# Patient Record
Sex: FEMALE | Race: WHITE | NOT HISPANIC OR LATINO | Employment: OTHER | URBAN - METROPOLITAN AREA
[De-identification: names, ages, dates, MRNs, and addresses within clinical notes are randomized per-mention and may not be internally consistent; named-entity substitution may affect disease eponyms.]

---

## 2023-03-21 DIAGNOSIS — E03.9 HYPOTHYROIDISM, UNSPECIFIED TYPE: Primary | ICD-10-CM

## 2023-03-21 RX ORDER — LEVOTHYROXINE SODIUM 75 UG/1
75 TABLET ORAL DAILY
Qty: 90 TABLET | Refills: 1 | Status: SHIPPED | OUTPATIENT
Start: 2023-03-21 | End: 2023-09-18

## 2023-03-21 RX ORDER — LEVOTHYROXINE SODIUM 75 UG/1
75 TABLET ORAL DAILY
COMMUNITY
End: 2023-03-21 | Stop reason: SDUPTHER

## 2023-05-05 DIAGNOSIS — I10 BENIGN ESSENTIAL HTN: Primary | ICD-10-CM

## 2023-05-05 RX ORDER — ATENOLOL AND CHLORTHALIDONE TABLET 50; 25 MG/1; MG/1
1 TABLET ORAL DAILY
COMMUNITY
End: 2023-05-05 | Stop reason: SDUPTHER

## 2023-05-05 RX ORDER — ATENOLOL AND CHLORTHALIDONE TABLET 50; 25 MG/1; MG/1
1 TABLET ORAL DAILY
Qty: 30 TABLET | Refills: 1 | Status: SHIPPED | OUTPATIENT
Start: 2023-05-05 | End: 2023-08-11 | Stop reason: SDUPTHER

## 2023-06-28 DIAGNOSIS — F51.01 PRIMARY INSOMNIA: Primary | ICD-10-CM

## 2023-06-28 RX ORDER — TRAZODONE HYDROCHLORIDE 50 MG/1
50 TABLET ORAL NIGHTLY
COMMUNITY
Start: 2017-02-06 | End: 2023-06-28 | Stop reason: SDUPTHER

## 2023-06-28 RX ORDER — TRAZODONE HYDROCHLORIDE 50 MG/1
50 TABLET ORAL NIGHTLY
Qty: 30 TABLET | Refills: 1 | Status: SHIPPED | OUTPATIENT
Start: 2023-06-28 | End: 2023-09-11

## 2023-07-27 DIAGNOSIS — F32.A DEPRESSION, UNSPECIFIED DEPRESSION TYPE: Primary | ICD-10-CM

## 2023-07-27 RX ORDER — FLUOXETINE HYDROCHLORIDE 20 MG/1
20 CAPSULE ORAL DAILY
Qty: 30 CAPSULE | Refills: 0 | Status: SHIPPED | OUTPATIENT
Start: 2023-07-27 | End: 2023-09-01

## 2023-08-10 ENCOUNTER — TELEPHONE (OUTPATIENT)
Dept: PRIMARY CARE | Facility: CLINIC | Age: 78
End: 2023-08-10
Payer: MEDICARE

## 2023-08-10 DIAGNOSIS — I10 BENIGN ESSENTIAL HTN: ICD-10-CM

## 2023-08-10 NOTE — TELEPHONE ENCOUNTER
Pt called asking for a 6 month follow up mammogram; [facility]

## 2023-08-11 ENCOUNTER — TELEPHONE (OUTPATIENT)
Dept: PRIMARY CARE | Facility: CLINIC | Age: 78
End: 2023-08-11
Payer: MEDICARE

## 2023-08-11 DIAGNOSIS — Z12.31 ENCOUNTER FOR SCREENING MAMMOGRAM FOR BREAST CANCER: Primary | ICD-10-CM

## 2023-08-11 DIAGNOSIS — R92.8 OTHER ABNORMAL AND INCONCLUSIVE FINDINGS ON DIAGNOSTIC IMAGING OF BREAST: ICD-10-CM

## 2023-08-11 DIAGNOSIS — N63.10 MASS OF RIGHT BREAST, UNSPECIFIED QUADRANT: Primary | ICD-10-CM

## 2023-08-11 RX ORDER — ATENOLOL AND CHLORTHALIDONE TABLET 50; 25 MG/1; MG/1
1 TABLET ORAL DAILY
Qty: 30 TABLET | Refills: 1 | Status: SHIPPED | OUTPATIENT
Start: 2023-08-11 | End: 2023-09-13 | Stop reason: SDUPTHER

## 2023-08-11 NOTE — TELEPHONE ENCOUNTER
Radiology report says to repeat at the time of regularly scheduled mammogram. So, I will order as screening.

## 2023-08-11 NOTE — TELEPHONE ENCOUNTER
Pt is requesting order for ultrsound of right breast. She states that the order that was just put in today is incorrect. She states that she does not need a screening.

## 2023-08-11 NOTE — TELEPHONE ENCOUNTER
Pt states that she is needs Dr. Sue to order a mammogram follow up for her. She says that there was an abnormality found with her last one that was done in Feb 2023.     [phone]

## 2023-08-31 DIAGNOSIS — R92.8 ABNORMAL MAMMOGRAM: Primary | ICD-10-CM

## 2023-09-01 DIAGNOSIS — F32.A DEPRESSION, UNSPECIFIED DEPRESSION TYPE: ICD-10-CM

## 2023-09-01 RX ORDER — FLUOXETINE HYDROCHLORIDE 20 MG/1
20 CAPSULE ORAL DAILY
Qty: 30 CAPSULE | Refills: 0 | Status: SHIPPED | OUTPATIENT
Start: 2023-09-01 | End: 2023-09-05

## 2023-09-04 DIAGNOSIS — F32.A DEPRESSION, UNSPECIFIED DEPRESSION TYPE: ICD-10-CM

## 2023-09-05 RX ORDER — FLUOXETINE HYDROCHLORIDE 20 MG/1
20 CAPSULE ORAL DAILY
Qty: 30 CAPSULE | Refills: 0 | Status: SHIPPED | OUTPATIENT
Start: 2023-09-05 | End: 2023-10-06 | Stop reason: SDUPTHER

## 2023-09-09 DIAGNOSIS — F51.01 PRIMARY INSOMNIA: ICD-10-CM

## 2023-09-11 RX ORDER — TRAZODONE HYDROCHLORIDE 50 MG/1
50 TABLET ORAL NIGHTLY
Qty: 30 TABLET | Refills: 0 | Status: SHIPPED | OUTPATIENT
Start: 2023-09-11 | End: 2023-11-01 | Stop reason: SDUPTHER

## 2023-09-13 ENCOUNTER — OFFICE VISIT (OUTPATIENT)
Dept: PRIMARY CARE | Facility: CLINIC | Age: 78
End: 2023-09-13
Payer: MEDICARE

## 2023-09-13 ENCOUNTER — LAB (OUTPATIENT)
Dept: LAB | Facility: LAB | Age: 78
End: 2023-09-13
Payer: MEDICARE

## 2023-09-13 VITALS
HEIGHT: 72 IN | WEIGHT: 239 LBS | DIASTOLIC BLOOD PRESSURE: 88 MMHG | TEMPERATURE: 97.1 F | HEART RATE: 56 BPM | BODY MASS INDEX: 32.37 KG/M2 | RESPIRATION RATE: 18 BRPM | OXYGEN SATURATION: 95 % | SYSTOLIC BLOOD PRESSURE: 152 MMHG

## 2023-09-13 DIAGNOSIS — I10 BENIGN ESSENTIAL HTN: ICD-10-CM

## 2023-09-13 DIAGNOSIS — Z00.00 HEALTHCARE MAINTENANCE: ICD-10-CM

## 2023-09-13 DIAGNOSIS — C55 MALIGNANT NEOPLASM OF UTERUS, UNSPECIFIED SITE (MULTI): ICD-10-CM

## 2023-09-13 DIAGNOSIS — N18.31 STAGE 3A CHRONIC KIDNEY DISEASE (MULTI): ICD-10-CM

## 2023-09-13 DIAGNOSIS — Z00.00 ROUTINE GENERAL MEDICAL EXAMINATION AT HEALTH CARE FACILITY: Primary | ICD-10-CM

## 2023-09-13 DIAGNOSIS — E03.9 ACQUIRED HYPOTHYROIDISM: ICD-10-CM

## 2023-09-13 DIAGNOSIS — E87.6 HYPOKALEMIA: Primary | ICD-10-CM

## 2023-09-13 DIAGNOSIS — Z23 NEED FOR IMMUNIZATION AGAINST INFLUENZA: ICD-10-CM

## 2023-09-13 DIAGNOSIS — Z00.00 ROUTINE GENERAL MEDICAL EXAMINATION AT HEALTH CARE FACILITY: ICD-10-CM

## 2023-09-13 PROBLEM — R79.89 ABNORMAL LFTS: Status: ACTIVE | Noted: 2023-09-13

## 2023-09-13 PROBLEM — R92.8 ABNORMAL MAMMOGRAM: Status: ACTIVE | Noted: 2023-09-13

## 2023-09-13 PROBLEM — M17.12 PRIMARY OSTEOARTHRITIS OF LEFT KNEE: Status: ACTIVE | Noted: 2023-09-13

## 2023-09-13 LAB
ALANINE AMINOTRANSFERASE (SGPT) (U/L) IN SER/PLAS: 18 U/L (ref 7–45)
ALBUMIN (G/DL) IN SER/PLAS: 4 G/DL (ref 3.4–5)
ALKALINE PHOSPHATASE (U/L) IN SER/PLAS: 73 U/L (ref 33–136)
ANION GAP IN SER/PLAS: 10 MMOL/L (ref 10–20)
ASPARTATE AMINOTRANSFERASE (SGOT) (U/L) IN SER/PLAS: 22 U/L (ref 9–39)
BILIRUBIN TOTAL (MG/DL) IN SER/PLAS: 0.6 MG/DL (ref 0–1.2)
CALCIUM (MG/DL) IN SER/PLAS: 8.9 MG/DL (ref 8.6–10.3)
CARBON DIOXIDE, TOTAL (MMOL/L) IN SER/PLAS: 36 MMOL/L (ref 21–32)
CHLORIDE (MMOL/L) IN SER/PLAS: 100 MMOL/L (ref 98–107)
CHOLESTEROL (MG/DL) IN SER/PLAS: 149 MG/DL (ref 0–199)
CHOLESTEROL IN HDL (MG/DL) IN SER/PLAS: 32.9 MG/DL
CHOLESTEROL/HDL RATIO: 4.5
CREATININE (MG/DL) IN SER/PLAS: 1.2 MG/DL (ref 0.5–1.05)
ERYTHROCYTE DISTRIBUTION WIDTH (RATIO) BY AUTOMATED COUNT: 13.5 % (ref 11.5–14.5)
ERYTHROCYTE MEAN CORPUSCULAR HEMOGLOBIN CONCENTRATION (G/DL) BY AUTOMATED: 33.3 G/DL (ref 32–36)
ERYTHROCYTE MEAN CORPUSCULAR VOLUME (FL) BY AUTOMATED COUNT: 88 FL (ref 80–100)
ERYTHROCYTES (10*6/UL) IN BLOOD BY AUTOMATED COUNT: 4.76 X10E12/L (ref 4–5.2)
GFR FEMALE: 46 ML/MIN/1.73M2
GLUCOSE (MG/DL) IN SER/PLAS: 122 MG/DL (ref 74–99)
HEMATOCRIT (%) IN BLOOD BY AUTOMATED COUNT: 42 % (ref 36–46)
HEMOGLOBIN (G/DL) IN BLOOD: 14 G/DL (ref 12–16)
LDL: 92 MG/DL (ref 0–99)
LEUKOCYTES (10*3/UL) IN BLOOD BY AUTOMATED COUNT: 8 X10E9/L (ref 4.4–11.3)
PLATELETS (10*3/UL) IN BLOOD AUTOMATED COUNT: 270 X10E9/L (ref 150–450)
POTASSIUM (MMOL/L) IN SER/PLAS: 2.9 MMOL/L (ref 3.5–5.3)
PROTEIN TOTAL: 7 G/DL (ref 6.4–8.2)
SODIUM (MMOL/L) IN SER/PLAS: 143 MMOL/L (ref 136–145)
THYROTROPIN (MIU/L) IN SER/PLAS BY DETECTION LIMIT <= 0.05 MIU/L: 2.09 MIU/L (ref 0.44–3.98)
TRIGLYCERIDE (MG/DL) IN SER/PLAS: 119 MG/DL (ref 0–149)
UREA NITROGEN (MG/DL) IN SER/PLAS: 15 MG/DL (ref 6–23)
VLDL: 24 MG/DL (ref 0–40)

## 2023-09-13 PROCEDURE — 1170F FXNL STATUS ASSESSED: CPT | Performed by: FAMILY MEDICINE

## 2023-09-13 PROCEDURE — 80053 COMPREHEN METABOLIC PANEL: CPT

## 2023-09-13 PROCEDURE — 1159F MED LIST DOCD IN RCRD: CPT | Performed by: FAMILY MEDICINE

## 2023-09-13 PROCEDURE — 80061 LIPID PANEL: CPT

## 2023-09-13 PROCEDURE — 85027 COMPLETE CBC AUTOMATED: CPT

## 2023-09-13 PROCEDURE — 36415 COLL VENOUS BLD VENIPUNCTURE: CPT

## 2023-09-13 PROCEDURE — 3079F DIAST BP 80-89 MM HG: CPT | Performed by: FAMILY MEDICINE

## 2023-09-13 PROCEDURE — G0008 ADMIN INFLUENZA VIRUS VAC: HCPCS | Performed by: FAMILY MEDICINE

## 2023-09-13 PROCEDURE — G0439 PPPS, SUBSEQ VISIT: HCPCS | Performed by: FAMILY MEDICINE

## 2023-09-13 PROCEDURE — 84443 ASSAY THYROID STIM HORMONE: CPT

## 2023-09-13 PROCEDURE — 90662 IIV NO PRSV INCREASED AG IM: CPT | Performed by: FAMILY MEDICINE

## 2023-09-13 PROCEDURE — 3077F SYST BP >= 140 MM HG: CPT | Performed by: FAMILY MEDICINE

## 2023-09-13 RX ORDER — ATENOLOL AND CHLORTHALIDONE TABLET 50; 25 MG/1; MG/1
1 TABLET ORAL DAILY
Qty: 30 TABLET | Refills: 11 | Status: SHIPPED | OUTPATIENT
Start: 2023-09-13

## 2023-09-13 RX ORDER — POTASSIUM CHLORIDE 20 MEQ/1
20 TABLET, EXTENDED RELEASE ORAL DAILY
Qty: 30 TABLET | Refills: 11 | Status: SHIPPED | OUTPATIENT
Start: 2023-09-13 | End: 2024-09-12

## 2023-09-13 RX ORDER — ATENOLOL AND CHLORTHALIDONE TABLET 50; 25 MG/1; MG/1
1 TABLET ORAL DAILY
Qty: 30 TABLET | Refills: 1 | Status: SHIPPED | OUTPATIENT
Start: 2023-09-13 | End: 2023-09-13 | Stop reason: SDUPTHER

## 2023-09-13 RX ORDER — INFLUENZA A VIRUS A/VICTORIA/4897/2022 IVR-238 (H1N1) ANTIGEN (FORMALDEHYDE INACTIVATED), INFLUENZA A VIRUS A/DARWIN/9/2021 SAN-010 (H3N2) ANTIGEN (FORMALDEHYDE INACTIVATED), INFLUENZA B VIRUS B/PHUKET/3073/2013 ANTIGEN (FORMALDEHYDE INACTIVATED), AND INFLUENZA B VIRUS B/MICHIGAN/01/2021 ANTIGEN (FORMALDEHYDE INACTIVATED) 60; 60; 60; 60 UG/.7ML; UG/.7ML; UG/.7ML; UG/.7ML
0.7 INJECTION, SUSPENSION INTRAMUSCULAR ONCE
Qty: 0.7 ML | Refills: 0 | Status: SHIPPED | OUTPATIENT
Start: 2023-09-13 | End: 2023-09-13 | Stop reason: ENTERED-IN-ERROR

## 2023-09-13 ASSESSMENT — ACTIVITIES OF DAILY LIVING (ADL)
DRESSING: INDEPENDENT
GROCERY_SHOPPING: INDEPENDENT
TAKING_MEDICATION: INDEPENDENT
BATHING: INDEPENDENT
MANAGING_FINANCES: INDEPENDENT
DOING_HOUSEWORK: INDEPENDENT

## 2023-09-13 ASSESSMENT — ENCOUNTER SYMPTOMS
PSYCHIATRIC NEGATIVE: 1
GASTROINTESTINAL NEGATIVE: 1
OCCASIONAL FEELINGS OF UNSTEADINESS: 0
ENDOCRINE NEGATIVE: 1
RESPIRATORY NEGATIVE: 1
CONSTITUTIONAL NEGATIVE: 1
CARDIOVASCULAR NEGATIVE: 1
EYES NEGATIVE: 1
NEUROLOGICAL NEGATIVE: 1
DEPRESSION: 0
LOSS OF SENSATION IN FEET: 0

## 2023-09-13 ASSESSMENT — PATIENT HEALTH QUESTIONNAIRE - PHQ9
2. FEELING DOWN, DEPRESSED OR HOPELESS: NOT AT ALL
1. LITTLE INTEREST OR PLEASURE IN DOING THINGS: NOT AT ALL
SUM OF ALL RESPONSES TO PHQ9 QUESTIONS 1 AND 2: 0

## 2023-09-13 NOTE — PROGRESS NOTES
Subjective   Reason for Visit: Fadia Akbar is an 78 y.o. female here for a Medicare Wellness visit.        Reviewed all medications by prescribing practitioner or clinical pharmacist (such as prescriptions, OTCs, herbal therapies and supplements) and documented in the medical record.    Patient Care Team:  Temitope Sue DO as PCP - General  Ángel Hair MD as PCP - [employer] PCP     Review of Systems   Constitutional: Negative.    HENT: Negative.     Eyes: Negative.    Respiratory: Negative.     Cardiovascular: Negative.    Gastrointestinal: Negative.    Endocrine: Negative.    Genitourinary: Negative.    Skin: Negative.    Neurological: Negative.    Psychiatric/Behavioral: Negative.         Objective   Vitals:  /88   Pulse 56   Temp 36.2 °C (97.1 °F)   Resp 18   Ht 1.829 m (6')   Wt 108 kg (239 lb)   SpO2 95%   BMI 32.41 kg/m²       Physical Exam  Constitutional:       General: She is not in acute distress.     Appearance: Normal appearance.   HENT:      Head: Normocephalic and atraumatic.      Right Ear: Tympanic membrane normal.      Left Ear: Tympanic membrane normal.      Nose: Nose normal.      Mouth/Throat:      Mouth: Mucous membranes are dry.      Pharynx: Oropharynx is clear.   Eyes:      Conjunctiva/sclera: Conjunctivae normal.      Pupils: Pupils are equal, round, and reactive to light.   Neck:      Vascular: No carotid bruit.   Cardiovascular:      Rate and Rhythm: Normal rate and regular rhythm.      Heart sounds: Normal heart sounds.   Pulmonary:      Effort: Pulmonary effort is normal.      Breath sounds: Normal breath sounds.   Abdominal:      General: Bowel sounds are normal.      Palpations: Abdomen is soft.   Musculoskeletal:      Cervical back: Neck supple. No tenderness.   Skin:     General: Skin is warm and dry.   Neurological:      General: No focal deficit present.      Mental Status: She is alert.   Psychiatric:         Mood and Affect: Mood  normal.         Behavior: Behavior normal.         Assessment/Plan   Problem List Items Addressed This Visit       Benign essential HTN    Relevant Medications    atenoloL-chlorthalidone (Tenoretic) 50-25 mg tablet    Other Relevant Orders    CBC    Comprehensive Metabolic Panel    Lipid Panel     Other Visit Diagnoses       Routine general medical examination at health care facility    -  Primary    Relevant Medications    atenoloL-chlorthalidone (Tenoretic) 50-25 mg tablet    Other Relevant Orders    CBC    Comprehensive Metabolic Panel    Lipid Panel    Healthcare maintenance        Relevant Medications    atenoloL-chlorthalidone (Tenoretic) 50-25 mg tablet    Other Relevant Orders    CBC    Comprehensive Metabolic Panel    Lipid Panel

## 2023-09-14 ENCOUNTER — TELEPHONE (OUTPATIENT)
Dept: PRIMARY CARE | Facility: CLINIC | Age: 78
End: 2023-09-14
Payer: MEDICARE

## 2023-09-16 DIAGNOSIS — E03.9 HYPOTHYROIDISM, UNSPECIFIED TYPE: ICD-10-CM

## 2023-09-18 RX ORDER — LEVOTHYROXINE SODIUM 75 UG/1
75 TABLET ORAL DAILY
Qty: 90 TABLET | Refills: 0 | Status: SHIPPED | OUTPATIENT
Start: 2023-09-18 | End: 2024-01-03

## 2023-10-06 DIAGNOSIS — F32.A DEPRESSION, UNSPECIFIED DEPRESSION TYPE: ICD-10-CM

## 2023-10-06 RX ORDER — FLUOXETINE HYDROCHLORIDE 20 MG/1
20 CAPSULE ORAL DAILY
Qty: 30 CAPSULE | Refills: 5 | Status: SHIPPED | OUTPATIENT
Start: 2023-10-06 | End: 2023-12-05 | Stop reason: SDUPTHER

## 2023-10-17 ENCOUNTER — LAB (OUTPATIENT)
Dept: LAB | Facility: LAB | Age: 78
End: 2023-10-17
Payer: MEDICARE

## 2023-10-17 DIAGNOSIS — E87.6 HYPOKALEMIA: ICD-10-CM

## 2023-10-17 LAB
ANION GAP SERPL CALC-SCNC: 15 MMOL/L (ref 10–20)
BUN SERPL-MCNC: 17 MG/DL (ref 6–23)
CALCIUM SERPL-MCNC: 9.5 MG/DL (ref 8.6–10.3)
CHLORIDE SERPL-SCNC: 98 MMOL/L (ref 98–107)
CO2 SERPL-SCNC: 33 MMOL/L (ref 21–32)
CREAT SERPL-MCNC: 1.47 MG/DL (ref 0.5–1.05)
GFR SERPL CREATININE-BSD FRML MDRD: 36 ML/MIN/1.73M*2
GLUCOSE SERPL-MCNC: 185 MG/DL (ref 74–99)
POTASSIUM SERPL-SCNC: 3.5 MMOL/L (ref 3.5–5.3)
SODIUM SERPL-SCNC: 142 MMOL/L (ref 136–145)

## 2023-10-17 PROCEDURE — 80048 BASIC METABOLIC PNL TOTAL CA: CPT

## 2023-10-17 PROCEDURE — 36415 COLL VENOUS BLD VENIPUNCTURE: CPT

## 2023-10-24 ENCOUNTER — TELEPHONE (OUTPATIENT)
Dept: PRIMARY CARE | Facility: CLINIC | Age: 78
End: 2023-10-24
Payer: MEDICARE

## 2023-10-24 NOTE — TELEPHONE ENCOUNTER
Pt is returning a call from our office. There is no documentation as to who called. Pt would like a call back in regards to this. Pt says it could be about recent lab results but she is not sure.

## 2023-10-26 ENCOUNTER — OFFICE VISIT (OUTPATIENT)
Dept: PRIMARY CARE | Facility: CLINIC | Age: 78
End: 2023-10-26
Payer: MEDICARE

## 2023-10-26 VITALS
HEIGHT: 72 IN | SYSTOLIC BLOOD PRESSURE: 130 MMHG | HEART RATE: 82 BPM | BODY MASS INDEX: 32.23 KG/M2 | DIASTOLIC BLOOD PRESSURE: 82 MMHG | TEMPERATURE: 97.8 F | OXYGEN SATURATION: 98 % | RESPIRATION RATE: 16 BRPM | WEIGHT: 238 LBS

## 2023-10-26 DIAGNOSIS — E11.9 NEW ONSET TYPE 2 DIABETES MELLITUS (MULTI): Primary | ICD-10-CM

## 2023-10-26 LAB
POC FINGERSTICK BLOOD GLUCOSE: 190 MG/DL (ref 70–100)
POC HEMOGLOBIN A1C: 6.6 % (ref 4.2–6.5)

## 2023-10-26 PROCEDURE — 3079F DIAST BP 80-89 MM HG: CPT | Performed by: FAMILY MEDICINE

## 2023-10-26 PROCEDURE — 1159F MED LIST DOCD IN RCRD: CPT | Performed by: FAMILY MEDICINE

## 2023-10-26 PROCEDURE — 82962 GLUCOSE BLOOD TEST: CPT | Performed by: FAMILY MEDICINE

## 2023-10-26 PROCEDURE — 3075F SYST BP GE 130 - 139MM HG: CPT | Performed by: FAMILY MEDICINE

## 2023-10-26 PROCEDURE — 99213 OFFICE O/P EST LOW 20 MIN: CPT | Performed by: FAMILY MEDICINE

## 2023-10-26 PROCEDURE — 1036F TOBACCO NON-USER: CPT | Performed by: FAMILY MEDICINE

## 2023-10-26 PROCEDURE — 83036 HEMOGLOBIN GLYCOSYLATED A1C: CPT | Performed by: FAMILY MEDICINE

## 2023-10-26 NOTE — PROGRESS NOTES
Subjective   Patient ID: Fadia Akbar is a 78 y.o. female who presents for Diabetes (New onset ).    Root beer: 1 hour ago   6-8 pop cans a day  Lots of frozen food   Amenable to dietician, PT (knees hurt)     Takes a nap 2 hours in afternoon, goes to bad at 8pm, gets up 7:30am. Gets up 5x a night due to dog (14 years old) wanting to go out.     Diabetes  She presents for her initial diabetic visit. She has type 2 diabetes mellitus. Her disease course has been stable. There are no hypoglycemic associated symptoms. Associated symptoms include polyuria. There are no diabetic complications. When asked about current treatments, none were reported.        Review of Systems   Endocrine: Positive for polyuria.     General: +fatigue, denies unintentional weight change    Objective   /82 (BP Location: Left arm, Patient Position: Sitting)   Pulse 82   Temp 36.6 °C (97.8 °F)   Resp 16   Ht 1.829 m (6')   Wt 108 kg (238 lb)   SpO2 98%   BMI 32.28 kg/m²     Physical Exam  Constitutional:       Appearance: Normal appearance.   HENT:      Head: Normocephalic and atraumatic.      Nose: Nose normal.      Mouth/Throat:      Mouth: Mucous membranes are moist.   Cardiovascular:      Rate and Rhythm: Normal rate and regular rhythm.   Pulmonary:      Effort: Pulmonary effort is normal.      Breath sounds: Normal breath sounds.   Neurological:      Mental Status: She is alert.         Assessment/Plan   Problem List Items Addressed This Visit    None  Visit Diagnoses         Codes    New onset type 2 diabetes mellitus (CMS/[facility])    -  Primary E11.9    Relevant Orders    POCT glycosylated hemoglobin (Hb A1C) manually resulted (Completed)    POCT fingerstick glucose manually resulted (Completed)    Referral to Diabetic Education

## 2023-11-01 DIAGNOSIS — F51.01 PRIMARY INSOMNIA: ICD-10-CM

## 2023-11-01 RX ORDER — TRAZODONE HYDROCHLORIDE 50 MG/1
50 TABLET ORAL NIGHTLY
Qty: 30 TABLET | Refills: 0 | Status: SHIPPED | OUTPATIENT
Start: 2023-11-01 | End: 2023-11-02

## 2023-11-02 DIAGNOSIS — F51.01 PRIMARY INSOMNIA: ICD-10-CM

## 2023-11-02 RX ORDER — TRAZODONE HYDROCHLORIDE 50 MG/1
50 TABLET ORAL NIGHTLY
Qty: 30 TABLET | Refills: 0 | Status: SHIPPED | OUTPATIENT
Start: 2023-11-02 | End: 2024-01-03

## 2023-12-05 DIAGNOSIS — F32.A DEPRESSION, UNSPECIFIED DEPRESSION TYPE: ICD-10-CM

## 2023-12-05 RX ORDER — FLUOXETINE HYDROCHLORIDE 20 MG/1
20 CAPSULE ORAL DAILY
Qty: 30 CAPSULE | Refills: 5 | Status: SHIPPED | OUTPATIENT
Start: 2023-12-05 | End: 2024-01-03 | Stop reason: SDUPTHER

## 2023-12-15 DIAGNOSIS — I10 BENIGN ESSENTIAL HTN: ICD-10-CM

## 2023-12-15 DIAGNOSIS — Z00.00 HEALTHCARE MAINTENANCE: ICD-10-CM

## 2023-12-15 DIAGNOSIS — Z00.00 ROUTINE GENERAL MEDICAL EXAMINATION AT HEALTH CARE FACILITY: ICD-10-CM

## 2023-12-15 RX ORDER — ATENOLOL AND CHLORTHALIDONE TABLET 50; 25 MG/1; MG/1
1 TABLET ORAL DAILY
Qty: 30 TABLET | Refills: 11 | Status: CANCELLED | OUTPATIENT
Start: 2023-12-15

## 2024-01-03 DIAGNOSIS — F51.01 PRIMARY INSOMNIA: ICD-10-CM

## 2024-01-03 DIAGNOSIS — F32.A DEPRESSION, UNSPECIFIED DEPRESSION TYPE: ICD-10-CM

## 2024-01-03 DIAGNOSIS — E03.9 HYPOTHYROIDISM, UNSPECIFIED TYPE: ICD-10-CM

## 2024-01-03 RX ORDER — TRAZODONE HYDROCHLORIDE 50 MG/1
50 TABLET ORAL NIGHTLY
Qty: 30 TABLET | Refills: 0 | Status: SHIPPED | OUTPATIENT
Start: 2024-01-03 | End: 2024-01-03 | Stop reason: SDUPTHER

## 2024-01-03 RX ORDER — LEVOTHYROXINE SODIUM 75 UG/1
75 TABLET ORAL DAILY
Qty: 90 TABLET | Refills: 0 | Status: SHIPPED | OUTPATIENT
Start: 2024-01-03 | End: 2024-04-08 | Stop reason: SDUPTHER

## 2024-01-03 RX ORDER — FLUOXETINE HYDROCHLORIDE 20 MG/1
20 CAPSULE ORAL DAILY
Qty: 30 CAPSULE | Refills: 3 | Status: SHIPPED | OUTPATIENT
Start: 2024-01-03 | End: 2024-03-08 | Stop reason: SDUPTHER

## 2024-01-03 RX ORDER — TRAZODONE HYDROCHLORIDE 50 MG/1
50 TABLET ORAL NIGHTLY
Qty: 30 TABLET | Refills: 3 | Status: SHIPPED | OUTPATIENT
Start: 2024-01-03 | End: 2024-03-06 | Stop reason: SDUPTHER

## 2024-01-16 NOTE — PROGRESS NOTES
Reason for Visit:  Fadia Akbar is a 78 y.o. female who presents for Initial DSME Visit    DSME - Global Assessment    Marital Status: .  Support Person: Name: daughter Li .    What do you hope to gain from this diabetes education visit? Newly diagnosed type 2 DM. More knowledge on diet. I'd gotten some books.     Have you had diabetes education in the past?  No.  In Your words, what is Diabetes: something to do with kidneys, frequent urination.   What Concerns you most about having diabetes: I don't want to be on dialysis    Readiness to Learn: demonstrates willingness to learn and demonstrates ability to learn  Preferred learning method: observing and doing    Household Composition: living independently, alone    Demographics:   Difficulties with: None Can still read, just tough at times due to macular degeneration  Highest Level of Education: Some College  Race/Ethnic Origin: White/  Occupation:  Retired  Work hours:     Health Status:  Smoking/Tobacco Use: No, patient does not smoke or use tobacco.  Alcohol Use: No, patient does not drink alcohol.    Type of Diabetes: Type 2  What year were you diagnosed with diabetes: this year  Family History: no      Patient Active Problem List    Diagnosis Date Noted    Benign essential HTN 09/13/2023    Hypothyroidism 09/13/2023    Abnormal LFTs 09/13/2023    Abnormal mammogram 09/13/2023    Primary osteoarthritis of left knee 09/13/2023    Stage 3a chronic kidney disease (CMS/HCC) 09/13/2023    Malignant neoplasm of uterus, unspecified site (CMS/[facility]) 09/13/2023        Lab Results   Component Value Date    HGBA1C 6.6 (A) 10/26/2023       Health Utilization Past 12 Months:   Hospital Admissions: No.  ER Visits: No.  Primary Care Visits: Yes.  Last Eye Exam : within 12 months  Podiatry : no  Dentist : Frequency: at least twice per year    Diabetes Self-Management Skills and Behaviors:   Do you exercise regularly: No, had to stop.   Started walking  "after DM diagnosis 3 months ago about once daily one-third of a mile and then injured knee. Have not been walking for 1 month since that. Plan to get back to it.   Physical Activity : walking I'm always on my feet. I have to be doing something. I don't sit much  Yes  How do you manage your diabetes when you are sick?: unsure    Diabetes Medications: none  Current Outpatient Medications   Medication Sig Dispense Refill    atenoloL-chlorthalidone (Tenoretic) 50-25 mg tablet Take 1 tablet by mouth once daily. 30 tablet 11    FLUoxetine (PROzac) 20 mg capsule Take 1 capsule (20 mg) by mouth once daily. 30 capsule 3    levothyroxine (Synthroid, Levoxyl) 75 mcg tablet TAKE ONE TABLET BY MOUTH ONCE DAILY 90 tablet 0    potassium chloride CR 20 mEq ER tablet Take 1 tablet (20 mEq) by mouth once daily. Do not crush, chew, or split. 30 tablet 11    traZODone (Desyrel) 50 mg tablet Take 1 tablet (50 mg) by mouth once daily at bedtime. 30 tablet 3     No current facility-administered medications for this visit.     Injection/Infusion Sites:     Monitorng: None prescribed    Acute Complications-Safety: none    Hypoglycemia: Frequency: rare occasion when skip a meal  Hypoglycemia Treatment: \"just eat something\"    Hyperglycemia: None  Hyperglycemia Treatment: denies, but does have have to urinate 2-3 times at night    Have you reached Menopause? : Yes    Diabetes Assessment:   DM Interferes with other aspects of my life: neutral.  My level of stress is high: disagree.  I struggle with making changes in my life: disagree.  How do you handle stress: denies, internalize it and stays calm  Most difficult part of managing DM: the diet and reading the labels    DSME - Meal Planning and Diet Recall  Are you currently following any meal plan:  Since diagnosis 3 months ago I've cut out pop, reduced frequency of eating fast food . Doesn't buy sweets anymore. I've gotten few recipe books and following recipes    Does your culture or " "Orthodox require any of the following:  none  Who does the grocery shopping? patient  Who does the cooking in the home? patient    How often do you eat out? Twice per month  How many meals do you eat in per day: three.  Which meals do you tend to skip: none  What do you drink with and between meals: water, diet drinks, and sugar free ice tea    Diet Recall:   At PCP visit 3 months ago & when diagnosed she was drinking 6-8 cans of pop per day. Ate lots of frozen foods. \"I am a sugar-aholic.\"     DSME - Goals and Recommendations    [facility] Diabetes Education Program SMART Behavior Goal Setting:        S - Specific: Exactly what do you want to do        M - Measurable: Use a calendar or chart to track progress        A - Attainable: Take small steps to make bigger changes        R - Realistic: Pick something reasonable that you know you can do        T - Time Oriented: Choose a time limit (No longer than 6 months)    Specific Goal:   I will aim to eat 5 vegetables and fruits total per day.     I will aim to eat 3- 4 ounces of protein at lunch daily.     Measurable: How will I track my goal:  I will keep track of my progress daily by  other .    Time Oriented: four weeks.    Topics Covered and Impression:    DSME Topics Covered During Visit:   A1c Review  Reviewed Hypoglycemia Signs/Symptoms/Tx Plan  Reviewed Hyperglycemia Signs/Symptoms/Tx Plan  Healthy Meal Plan  MyPlate Method        DSME Topics for Follow-Up:   Understanding Diabetes Basics  Reviewed Chronic Complications/Risks Related to Diabetes  Being Physically Active  Healthy Meal Plan  MyPlate Method  Sick Day Rules    Materials provided during today's visit:  DM patient guide, 2 handouts on exercise, blood glucose log sheet, A1C handout, hypo/hyperglycemia signs/symptoms & treatment for hypoglycemia. Non-starchy vegetables list. [employer] healthy meal planning booklet.      Provider Impression: Patient with newly diagnosed type 2 DM 3 months " "ago. She's since cut out 6-8 cans pops daily and reduced fast food intake. She's lost 12 pounds. Wants to learn more about diet and admits \"I'm a sugar-aholic\" and a \"picky eater.\"  She had a lot of questions about diet. Reviewed basic carb counting, label reading, and exchange lists. Discussed healthier options for some of the food choices she's currently eating. Admits she doesn't get enough protein in her diet due to not liking meat, however later said she does like chicken and sometimes will eat beef. Importance of including enough protein, healthy fat and fiber at meals and snacks reviewed. Discussed other protein sources she can include in her diet that she likes such as beans and lentils. Provided examples of healthy sources of fat, healthier carbohydrate choices and ways to get more fiber into her diet. Discussed natural sugar free substitutes such as stevia, allulose and monkfruit. She has not exercised for one month due to slipping while walking and injured her knee. She was candid that exercise is always mentioned in relation to weight loss and she's at a point in her life where \"who cares.\" Suggested she frame reason to exercise as not to lose weight, but to get the blood pumping and moving. To view it as a way to improve her blood sugar, blood pressure and cholesterol - a wellness tool. States she does plan to start walking again. The referring provider is within the same EMR and is able to see the DSMES provided and the outcomes.       Time: I personally spent a total of 90 minutes with the patient providing diabetes self-management education. Visit documentation will be sent electronically to referring provider.          "

## 2024-01-17 ENCOUNTER — NUTRITION (OUTPATIENT)
Dept: NUTRITION | Facility: CLINIC | Age: 79
End: 2024-01-17
Payer: MEDICARE

## 2024-01-17 DIAGNOSIS — E11.9 NEW ONSET TYPE 2 DIABETES MELLITUS (MULTI): ICD-10-CM

## 2024-01-17 PROCEDURE — G0108 DIAB MANAGE TRN  PER INDIV: HCPCS | Performed by: RADIOLOGY

## 2024-01-26 ENCOUNTER — APPOINTMENT (OUTPATIENT)
Dept: PRIMARY CARE | Facility: CLINIC | Age: 79
End: 2024-01-26
Payer: MEDICARE

## 2024-01-29 ENCOUNTER — LAB (OUTPATIENT)
Dept: LAB | Facility: LAB | Age: 79
End: 2024-01-29
Payer: MEDICARE

## 2024-01-29 ENCOUNTER — OFFICE VISIT (OUTPATIENT)
Dept: PRIMARY CARE | Facility: CLINIC | Age: 79
End: 2024-01-29
Payer: MEDICARE

## 2024-01-29 VITALS
HEIGHT: 72 IN | SYSTOLIC BLOOD PRESSURE: 124 MMHG | WEIGHT: 228 LBS | RESPIRATION RATE: 16 BRPM | TEMPERATURE: 97.8 F | OXYGEN SATURATION: 98 % | BODY MASS INDEX: 30.88 KG/M2 | DIASTOLIC BLOOD PRESSURE: 76 MMHG | HEART RATE: 80 BPM

## 2024-01-29 DIAGNOSIS — E11.9 NEW ONSET TYPE 2 DIABETES MELLITUS (MULTI): ICD-10-CM

## 2024-01-29 DIAGNOSIS — I10 BENIGN ESSENTIAL HTN: ICD-10-CM

## 2024-01-29 DIAGNOSIS — I10 BENIGN ESSENTIAL HTN: Primary | ICD-10-CM

## 2024-01-29 LAB
ANION GAP SERPL CALC-SCNC: 11 MMOL/L (ref 10–20)
BUN SERPL-MCNC: 21 MG/DL (ref 6–23)
CALCIUM SERPL-MCNC: 9.7 MG/DL (ref 8.6–10.3)
CHLORIDE SERPL-SCNC: 101 MMOL/L (ref 98–107)
CO2 SERPL-SCNC: 33 MMOL/L (ref 21–32)
CREAT SERPL-MCNC: 1.48 MG/DL (ref 0.5–1.05)
EGFRCR SERPLBLD CKD-EPI 2021: 36 ML/MIN/1.73M*2
GLUCOSE SERPL-MCNC: 96 MG/DL (ref 74–99)
POC HEMOGLOBIN A1C: 6 % (ref 4.2–6.5)
POTASSIUM SERPL-SCNC: 3.9 MMOL/L (ref 3.5–5.3)
SODIUM SERPL-SCNC: 141 MMOL/L (ref 136–145)

## 2024-01-29 PROCEDURE — 3078F DIAST BP <80 MM HG: CPT | Performed by: FAMILY MEDICINE

## 2024-01-29 PROCEDURE — 80048 BASIC METABOLIC PNL TOTAL CA: CPT

## 2024-01-29 PROCEDURE — 99213 OFFICE O/P EST LOW 20 MIN: CPT | Performed by: FAMILY MEDICINE

## 2024-01-29 PROCEDURE — 36415 COLL VENOUS BLD VENIPUNCTURE: CPT

## 2024-01-29 PROCEDURE — 1036F TOBACCO NON-USER: CPT | Performed by: FAMILY MEDICINE

## 2024-01-29 PROCEDURE — 83036 HEMOGLOBIN GLYCOSYLATED A1C: CPT | Performed by: FAMILY MEDICINE

## 2024-01-29 PROCEDURE — 3074F SYST BP LT 130 MM HG: CPT | Performed by: FAMILY MEDICINE

## 2024-01-29 PROCEDURE — 1159F MED LIST DOCD IN RCRD: CPT | Performed by: FAMILY MEDICINE

## 2024-01-29 ASSESSMENT — ENCOUNTER SYMPTOMS: HYPERTENSION: 1

## 2024-01-29 NOTE — PROGRESS NOTES
Subjective     Patient ID: Fadia Akbar is a 78 y.o. female who presents for Hypertension, Diabetes, and Hypothyroidism.  Hypertension  This is a chronic problem. The current episode started more than 1 year ago. The problem is unchanged. The problem is controlled. There are no associated agents to hypertension. Risk factors for coronary artery disease include diabetes mellitus. Past treatments include diuretics and beta blockers. The current treatment provides significant improvement. There are no compliance problems.    Diabetes  She presents for her follow-up diabetic visit. She has type 2 diabetes mellitus. Her disease course has been improving. There are no hypoglycemic complications. Symptoms are stable. There are no diabetic complications. When asked about current treatments, none were reported.       Review of Systems    Objective     Vitals:    01/29/24 1035   BP: 124/76   BP Location: Left arm   Patient Position: Sitting   Pulse: 80   Resp: 16   Temp: 36.6 °C (97.8 °F)   SpO2: 98%   Weight: 103 kg (228 lb)   Height: 1.829 m (6')        Current Outpatient Medications   Medication Instructions    atenoloL-chlorthalidone (Tenoretic) 50-25 mg tablet 1 tablet, oral, Daily    FLUoxetine (PROZAC) 20 mg, oral, Daily    levothyroxine (SYNTHROID, LEVOXYL) 75 mcg, oral, Daily    potassium chloride CR 20 mEq ER tablet 20 mEq, oral, Daily, Do not crush, chew, or split.    traZODone (DESYREL) 50 mg, oral, Nightly        Physical Exam  Constitutional:       Appearance: Normal appearance.   HENT:      Head: Normocephalic and atraumatic.      Right Ear: Tympanic membrane normal.      Left Ear: Tympanic membrane normal.      Nose: Nose normal.      Mouth/Throat:      Mouth: Mucous membranes are moist.   Cardiovascular:      Rate and Rhythm: Normal rate and regular rhythm.   Pulmonary:      Effort: Pulmonary effort is normal.      Breath sounds: Normal breath sounds.   Neurological:      Mental Status: She is alert.          Assessment/Plan   Problem List Items Addressed This Visit             ICD-10-CM    Benign essential HTN - Primary I10    Relevant Orders    Basic Metabolic Panel     Other Visit Diagnoses         Codes    New onset type 2 diabetes mellitus (CMS/HCC)     E11.9    Relevant Orders    POCT glycosylated hemoglobin (Hb A1C) manually resulted (Completed)    Basic Metabolic Panel

## 2024-02-20 ENCOUNTER — APPOINTMENT (OUTPATIENT)
Dept: NUTRITION | Facility: CLINIC | Age: 79
End: 2024-02-20
Payer: MEDICARE

## 2024-03-06 DIAGNOSIS — F51.01 PRIMARY INSOMNIA: ICD-10-CM

## 2024-03-06 RX ORDER — TRAZODONE HYDROCHLORIDE 50 MG/1
50 TABLET ORAL NIGHTLY
Qty: 30 TABLET | Refills: 3 | Status: SHIPPED | OUTPATIENT
Start: 2024-03-06 | End: 2024-04-24 | Stop reason: SDUPTHER

## 2024-03-08 DIAGNOSIS — F32.A DEPRESSION, UNSPECIFIED DEPRESSION TYPE: ICD-10-CM

## 2024-03-08 RX ORDER — FLUOXETINE HYDROCHLORIDE 20 MG/1
20 CAPSULE ORAL DAILY
Qty: 30 CAPSULE | Refills: 3 | Status: SHIPPED | OUTPATIENT
Start: 2024-03-08 | End: 2024-04-08 | Stop reason: SDUPTHER

## 2024-04-01 DIAGNOSIS — D22.4 ATYPICAL NEVUS OF NECK: Primary | ICD-10-CM

## 2024-04-08 DIAGNOSIS — E03.9 HYPOTHYROIDISM, UNSPECIFIED TYPE: ICD-10-CM

## 2024-04-08 DIAGNOSIS — F32.A DEPRESSION, UNSPECIFIED DEPRESSION TYPE: ICD-10-CM

## 2024-04-08 RX ORDER — LEVOTHYROXINE SODIUM 75 UG/1
75 TABLET ORAL DAILY
Qty: 90 TABLET | Refills: 0 | Status: SHIPPED | OUTPATIENT
Start: 2024-04-08

## 2024-04-08 RX ORDER — FLUOXETINE HYDROCHLORIDE 20 MG/1
20 CAPSULE ORAL DAILY
Qty: 90 CAPSULE | Refills: 1 | Status: SHIPPED | OUTPATIENT
Start: 2024-04-08

## 2024-04-10 ENCOUNTER — OFFICE VISIT (OUTPATIENT)
Dept: DERMATOLOGY | Facility: CLINIC | Age: 79
End: 2024-04-10
Payer: MEDICARE

## 2024-04-10 DIAGNOSIS — L30.9 ECZEMA, UNSPECIFIED TYPE: Primary | ICD-10-CM

## 2024-04-10 DIAGNOSIS — L82.1 SEBORRHEIC KERATOSIS: ICD-10-CM

## 2024-04-10 DIAGNOSIS — L91.8 SKIN TAG: ICD-10-CM

## 2024-04-10 PROCEDURE — 1036F TOBACCO NON-USER: CPT | Performed by: NURSE PRACTITIONER

## 2024-04-10 PROCEDURE — 99203 OFFICE O/P NEW LOW 30 MIN: CPT | Performed by: NURSE PRACTITIONER

## 2024-04-10 PROCEDURE — 1159F MED LIST DOCD IN RCRD: CPT | Performed by: NURSE PRACTITIONER

## 2024-04-10 RX ORDER — TRIAMCINOLONE ACETONIDE 1 MG/G
CREAM TOPICAL 2 TIMES DAILY PRN
Qty: 80 G | Refills: 1 | Status: SHIPPED | OUTPATIENT
Start: 2024-04-10

## 2024-04-10 NOTE — PROGRESS NOTES
Subjective     Fadia Akbar is a 79 y.o. female who presents for the following: multiple lesions.     New patient in for multiple lesion one to her mid chest present for years and to her left neck.     Review of Systems:  No other skin or systemic complaints other than what is documented elsewhere in the note.    The following portions of the chart were reviewed this encounter and updated as appropriate:       Skin Cancer History  No skin cancer on file.    Specialty Problems    None    Past Medical History:  Fadia Akbar  has no past medical history on file.    Past Surgical History:  Fadia Akbar  has a past surgical history that includes Other surgical history (06/15/2022).    Family History:  Patient family history is not on file.    Social History:  Fadia Akbar  reports that she has never smoked. She has never used smokeless tobacco. She reports that she does not use drugs. No history on file for alcohol use.    Allergies:  Penicillins    Current Medications / CAM's:    Current Outpatient Medications:     atenoloL-chlorthalidone (Tenoretic) 50-25 mg tablet, Take 1 tablet by mouth once daily., Disp: 30 tablet, Rfl: 11    FLUoxetine (PROzac) 20 mg capsule, Take 1 capsule (20 mg) by mouth once daily., Disp: 90 capsule, Rfl: 1    levothyroxine (Synthroid, Levoxyl) 75 mcg tablet, Take 1 tablet (75 mcg) by mouth once daily., Disp: 90 tablet, Rfl: 0    potassium chloride CR 20 mEq ER tablet, Take 1 tablet (20 mEq) by mouth once daily. Do not crush, chew, or split., Disp: 30 tablet, Rfl: 11    traZODone (Desyrel) 50 mg tablet, Take 1 tablet (50 mg) by mouth once daily at bedtime., Disp: 30 tablet, Rfl: 3    triamcinolone (Kenalog) 0.1 % cream, Apply topically 2 times a day as needed for rash., Disp: 80 g, Rfl: 1     Objective   Well appearing patient in no apparent distress; mood and affect are within normal limits.      Assessment/Plan   1. Eczema, unspecified type  Left Forearm - Anterior; Right  "Forearm - Anterior    Related Medications  triamcinolone (Kenalog) 0.1 % cream  Apply topically 2 times a day as needed for rash.    2. Seborrheic keratosis  Chest - Medial (Center)    Seborrheic keratoses (SKs) are extremely common benign neoplasms of the skin. There can be few or hundreds of these raised, \"stuck-on\"-appearing papules and plaques with well-defined borders. The cause is unknown, although there is a familial trait for the development of multiple SKs.      SKs tend to increase in incidence and number with increasing age.     Skin Care: Seborrheic Keratoses are benign. No treatment is necessary.    Patient was instructed to call the office if any lesions become irritated or inflamed        3. Skin tag (2)  Left Anterior Neck, Left Supraclavicular Area  Fleshy, skin-colored sessile and pedunculated papules.            "

## 2024-04-24 ENCOUNTER — LAB (OUTPATIENT)
Dept: LAB | Facility: LAB | Age: 79
End: 2024-04-24
Payer: MEDICARE

## 2024-04-24 ENCOUNTER — OFFICE VISIT (OUTPATIENT)
Dept: PRIMARY CARE | Facility: CLINIC | Age: 79
End: 2024-04-24
Payer: MEDICARE

## 2024-04-24 VITALS
WEIGHT: 227 LBS | BODY MASS INDEX: 30.75 KG/M2 | RESPIRATION RATE: 18 BRPM | TEMPERATURE: 97.3 F | HEART RATE: 64 BPM | DIASTOLIC BLOOD PRESSURE: 88 MMHG | OXYGEN SATURATION: 96 % | SYSTOLIC BLOOD PRESSURE: 144 MMHG | HEIGHT: 72 IN

## 2024-04-24 DIAGNOSIS — F51.01 PRIMARY INSOMNIA: ICD-10-CM

## 2024-04-24 DIAGNOSIS — E11.9 TYPE 2 DIABETES MELLITUS WITHOUT COMPLICATION, WITHOUT LONG-TERM CURRENT USE OF INSULIN (MULTI): ICD-10-CM

## 2024-04-24 DIAGNOSIS — I10 BENIGN ESSENTIAL HTN: ICD-10-CM

## 2024-04-24 DIAGNOSIS — C55 MALIGNANT NEOPLASM OF UTERUS, UNSPECIFIED SITE (MULTI): ICD-10-CM

## 2024-04-24 DIAGNOSIS — N18.31 STAGE 3A CHRONIC KIDNEY DISEASE (MULTI): ICD-10-CM

## 2024-04-24 DIAGNOSIS — E11.9 TYPE 2 DIABETES MELLITUS WITHOUT COMPLICATION, WITHOUT LONG-TERM CURRENT USE OF INSULIN (MULTI): Primary | ICD-10-CM

## 2024-04-24 LAB
ALBUMIN SERPL BCP-MCNC: 4.3 G/DL (ref 3.4–5)
ALP SERPL-CCNC: 67 U/L (ref 33–136)
ALT SERPL W P-5'-P-CCNC: 17 U/L (ref 7–45)
ANION GAP SERPL CALC-SCNC: 12 MMOL/L (ref 10–20)
AST SERPL W P-5'-P-CCNC: 22 U/L (ref 9–39)
BILIRUB SERPL-MCNC: 0.8 MG/DL (ref 0–1.2)
BUN SERPL-MCNC: 18 MG/DL (ref 6–23)
CALCIUM SERPL-MCNC: 9.7 MG/DL (ref 8.6–10.3)
CHLORIDE SERPL-SCNC: 103 MMOL/L (ref 98–107)
CHOLEST SERPL-MCNC: 156 MG/DL (ref 0–199)
CHOLESTEROL/HDL RATIO: 4.6
CO2 SERPL-SCNC: 31 MMOL/L (ref 21–32)
CREAT SERPL-MCNC: 1.28 MG/DL (ref 0.5–1.05)
EGFRCR SERPLBLD CKD-EPI 2021: 43 ML/MIN/1.73M*2
ERYTHROCYTE [DISTWIDTH] IN BLOOD BY AUTOMATED COUNT: 12.8 % (ref 11.5–14.5)
GLUCOSE SERPL-MCNC: 93 MG/DL (ref 74–99)
HCT VFR BLD AUTO: 43.8 % (ref 36–46)
HDLC SERPL-MCNC: 34 MG/DL
HGB BLD-MCNC: 14.7 G/DL (ref 12–16)
LDLC SERPL CALC-MCNC: 97 MG/DL
MCH RBC QN AUTO: 30.1 PG (ref 26–34)
MCHC RBC AUTO-ENTMCNC: 33.6 G/DL (ref 32–36)
MCV RBC AUTO: 90 FL (ref 80–100)
NON HDL CHOLESTEROL: 122 MG/DL (ref 0–149)
NRBC BLD-RTO: 0 /100 WBCS (ref 0–0)
PLATELET # BLD AUTO: 274 X10*3/UL (ref 150–450)
POC HEMOGLOBIN A1C: 5.9 % (ref 4.2–6.5)
POTASSIUM SERPL-SCNC: 4.4 MMOL/L (ref 3.5–5.3)
PROT SERPL-MCNC: 7 G/DL (ref 6.4–8.2)
RBC # BLD AUTO: 4.89 X10*6/UL (ref 4–5.2)
SODIUM SERPL-SCNC: 142 MMOL/L (ref 136–145)
TRIGL SERPL-MCNC: 127 MG/DL (ref 0–149)
VLDL: 25 MG/DL (ref 0–40)
WBC # BLD AUTO: 7.5 X10*3/UL (ref 4.4–11.3)

## 2024-04-24 PROCEDURE — 1036F TOBACCO NON-USER: CPT | Performed by: FAMILY MEDICINE

## 2024-04-24 PROCEDURE — 80053 COMPREHEN METABOLIC PANEL: CPT

## 2024-04-24 PROCEDURE — 3077F SYST BP >= 140 MM HG: CPT | Performed by: FAMILY MEDICINE

## 2024-04-24 PROCEDURE — 99214 OFFICE O/P EST MOD 30 MIN: CPT | Performed by: FAMILY MEDICINE

## 2024-04-24 PROCEDURE — 1159F MED LIST DOCD IN RCRD: CPT | Performed by: FAMILY MEDICINE

## 2024-04-24 PROCEDURE — 80061 LIPID PANEL: CPT

## 2024-04-24 PROCEDURE — 83036 HEMOGLOBIN GLYCOSYLATED A1C: CPT | Performed by: FAMILY MEDICINE

## 2024-04-24 PROCEDURE — 85027 COMPLETE CBC AUTOMATED: CPT

## 2024-04-24 PROCEDURE — 36415 COLL VENOUS BLD VENIPUNCTURE: CPT

## 2024-04-24 PROCEDURE — 3079F DIAST BP 80-89 MM HG: CPT | Performed by: FAMILY MEDICINE

## 2024-04-24 PROCEDURE — 1124F ACP DISCUSS-NO DSCNMKR DOCD: CPT | Performed by: FAMILY MEDICINE

## 2024-04-24 RX ORDER — TRAZODONE HYDROCHLORIDE 50 MG/1
50 TABLET ORAL NIGHTLY
Qty: 30 TABLET | Refills: 3 | Status: SHIPPED | OUTPATIENT
Start: 2024-04-24

## 2024-04-24 ASSESSMENT — ENCOUNTER SYMPTOMS
HYPERTENSION: 1
INSOMNIA: 1

## 2024-04-24 NOTE — PROGRESS NOTES
Subjective     Patient ID: Fadia Akbar is a 79 y.o. female who presents for Diabetes, Insomnia, and Hypertension.  Diabetes  She presents for her follow-up diabetic visit. She has type 2 diabetes mellitus. Her disease course has been stable. There are no hypoglycemic associated symptoms. Symptoms are improving. There are no diabetic complications. Risk factors for coronary artery disease include dyslipidemia. Current diabetic treatment includes diet.   Insomnia  This is a chronic problem. The current episode started more than 1 year ago. The problem occurs constantly. The problem has been unchanged. Treatments tried: trazodone. The treatment provided significant relief.   Hypertension  This is a chronic problem. The current episode started more than 1 year ago. The problem is unchanged. The problem is controlled. Past treatments include beta blockers and diuretics. The current treatment provides no improvement. There are no compliance problems.          Objective     Vitals:    04/24/24 0922   BP: 144/88   BP Location: Right arm   Patient Position: Sitting   Pulse: 64   Resp: 18   Temp: 36.3 °C (97.3 °F)   TempSrc: Temporal   SpO2: 96%   Weight: 103 kg (227 lb)   Height: 1.829 m (6')        Current Outpatient Medications   Medication Instructions    atenoloL-chlorthalidone (Tenoretic) 50-25 mg tablet 1 tablet, oral, Daily    FLUoxetine (PROZAC) 20 mg, oral, Daily    levothyroxine (SYNTHROID, LEVOXYL) 75 mcg, oral, Daily    potassium chloride CR 20 mEq ER tablet 20 mEq, oral, Daily, Do not crush, chew, or split.    traZODone (DESYREL) 50 mg, oral, Nightly    triamcinolone (Kenalog) 0.1 % cream Topical, 2 times daily PRN        Physical Exam  Constitutional:       Appearance: Normal appearance.   HENT:      Head: Normocephalic and atraumatic.      Right Ear: Tympanic membrane normal.      Left Ear: Tympanic membrane normal.      Nose: Nose normal.      Mouth/Throat:      Mouth: Mucous membranes are moist.    Cardiovascular:      Rate and Rhythm: Normal rate and regular rhythm.   Pulmonary:      Effort: Pulmonary effort is normal.      Breath sounds: Normal breath sounds.   Neurological:      Mental Status: She is alert.         Assessment/Plan   Problem List Items Addressed This Visit             ICD-10-CM    Benign essential HTN I10    Relevant Orders    CBC    Comprehensive Metabolic Panel    Lipid Panel     Other Visit Diagnoses         Codes    Type 2 diabetes mellitus without complication, without long-term current use of insulin (Multi)    -  Primary E11.9    Controlled with diet. Will see her every 3 months to monitor     Relevant Orders    POCT glycosylated hemoglobin (Hb A1C) manually resulted (Completed)    CBC    Comprehensive Metabolic Panel    Lipid Panel    Primary insomnia     F51.01    Continue trazodone     Relevant Medications    traZODone (Desyrel) 50 mg tablet    Other Relevant Orders    CBC    Comprehensive Metabolic Panel    Lipid Panel

## 2024-07-12 DIAGNOSIS — E03.9 HYPOTHYROIDISM, UNSPECIFIED TYPE: ICD-10-CM

## 2024-07-12 RX ORDER — LEVOTHYROXINE SODIUM 75 UG/1
75 TABLET ORAL DAILY
Qty: 90 TABLET | Refills: 0 | Status: SHIPPED | OUTPATIENT
Start: 2024-07-12

## 2024-07-24 ENCOUNTER — APPOINTMENT (OUTPATIENT)
Dept: PRIMARY CARE | Facility: CLINIC | Age: 79
End: 2024-07-24
Payer: MEDICARE

## 2024-09-26 DIAGNOSIS — Z00.00 HEALTHCARE MAINTENANCE: ICD-10-CM

## 2024-09-26 DIAGNOSIS — Z00.00 ROUTINE GENERAL MEDICAL EXAMINATION AT HEALTH CARE FACILITY: ICD-10-CM

## 2024-09-26 DIAGNOSIS — I10 BENIGN ESSENTIAL HTN: ICD-10-CM

## 2024-09-26 DIAGNOSIS — E87.6 HYPOKALEMIA: ICD-10-CM

## 2024-09-26 RX ORDER — POTASSIUM CHLORIDE 1500 MG/1
20 TABLET, EXTENDED RELEASE ORAL DAILY
Qty: 30 TABLET | Refills: 0 | Status: SHIPPED | OUTPATIENT
Start: 2024-09-26

## 2024-09-26 RX ORDER — ATENOLOL AND CHLORTHALIDONE TABLET 50; 25 MG/1; MG/1
1 TABLET ORAL DAILY
Qty: 30 TABLET | Refills: 0 | Status: SHIPPED | OUTPATIENT
Start: 2024-09-26

## 2024-10-02 ENCOUNTER — APPOINTMENT (OUTPATIENT)
Dept: DERMATOLOGY | Facility: CLINIC | Age: 79
End: 2024-10-02
Payer: MEDICARE

## 2024-10-14 DIAGNOSIS — E03.9 HYPOTHYROIDISM, UNSPECIFIED TYPE: ICD-10-CM

## 2024-10-14 DIAGNOSIS — F32.A DEPRESSION, UNSPECIFIED DEPRESSION TYPE: ICD-10-CM

## 2024-10-15 RX ORDER — LEVOTHYROXINE SODIUM 75 UG/1
75 TABLET ORAL DAILY
Qty: 90 TABLET | Refills: 0 | Status: SHIPPED | OUTPATIENT
Start: 2024-10-15

## 2024-10-15 RX ORDER — FLUOXETINE HYDROCHLORIDE 20 MG/1
20 CAPSULE ORAL DAILY
Qty: 90 CAPSULE | Refills: 0 | Status: SHIPPED | OUTPATIENT
Start: 2024-10-15

## 2024-11-11 DIAGNOSIS — E87.6 HYPOKALEMIA: ICD-10-CM

## 2024-11-11 DIAGNOSIS — Z00.00 HEALTHCARE MAINTENANCE: ICD-10-CM

## 2024-11-11 DIAGNOSIS — Z00.00 ROUTINE GENERAL MEDICAL EXAMINATION AT HEALTH CARE FACILITY: ICD-10-CM

## 2024-11-11 DIAGNOSIS — I10 BENIGN ESSENTIAL HTN: ICD-10-CM

## 2024-11-11 RX ORDER — POTASSIUM CHLORIDE 1500 MG/1
20 TABLET, EXTENDED RELEASE ORAL DAILY
Qty: 30 TABLET | Refills: 0 | Status: SHIPPED | OUTPATIENT
Start: 2024-11-11

## 2024-11-11 RX ORDER — ATENOLOL AND CHLORTHALIDONE TABLET 50; 25 MG/1; MG/1
1 TABLET ORAL DAILY
Qty: 30 TABLET | Refills: 0 | Status: SHIPPED | OUTPATIENT
Start: 2024-11-11

## 2024-11-14 ENCOUNTER — APPOINTMENT (OUTPATIENT)
Dept: PRIMARY CARE | Facility: CLINIC | Age: 79
End: 2024-11-14
Payer: MEDICARE

## 2024-11-14 VITALS
HEIGHT: 72 IN | BODY MASS INDEX: 30.61 KG/M2 | HEART RATE: 62 BPM | OXYGEN SATURATION: 98 % | WEIGHT: 226 LBS | SYSTOLIC BLOOD PRESSURE: 128 MMHG | RESPIRATION RATE: 16 BRPM | DIASTOLIC BLOOD PRESSURE: 82 MMHG | TEMPERATURE: 97.8 F

## 2024-11-14 DIAGNOSIS — Z00.00 ROUTINE GENERAL MEDICAL EXAMINATION AT HEALTH CARE FACILITY: Primary | ICD-10-CM

## 2024-11-14 DIAGNOSIS — Z23 NEED FOR IMMUNIZATION AGAINST INFLUENZA: ICD-10-CM

## 2024-11-14 DIAGNOSIS — E11.9 TYPE 2 DIABETES MELLITUS WITHOUT COMPLICATION, WITHOUT LONG-TERM CURRENT USE OF INSULIN (MULTI): ICD-10-CM

## 2024-11-14 DIAGNOSIS — Z00.00 HEALTHCARE MAINTENANCE: ICD-10-CM

## 2024-11-14 LAB — POC HEMOGLOBIN A1C: 5.6 % (ref 4.2–6.5)

## 2024-11-14 PROCEDURE — 1124F ACP DISCUSS-NO DSCNMKR DOCD: CPT | Performed by: FAMILY MEDICINE

## 2024-11-14 PROCEDURE — 3074F SYST BP LT 130 MM HG: CPT | Performed by: FAMILY MEDICINE

## 2024-11-14 PROCEDURE — 83036 HEMOGLOBIN GLYCOSYLATED A1C: CPT | Performed by: FAMILY MEDICINE

## 2024-11-14 PROCEDURE — 90662 IIV NO PRSV INCREASED AG IM: CPT | Performed by: FAMILY MEDICINE

## 2024-11-14 PROCEDURE — 1036F TOBACCO NON-USER: CPT | Performed by: FAMILY MEDICINE

## 2024-11-14 PROCEDURE — 93000 ELECTROCARDIOGRAM COMPLETE: CPT | Performed by: FAMILY MEDICINE

## 2024-11-14 PROCEDURE — G0008 ADMIN INFLUENZA VIRUS VAC: HCPCS | Performed by: FAMILY MEDICINE

## 2024-11-14 PROCEDURE — 1159F MED LIST DOCD IN RCRD: CPT | Performed by: FAMILY MEDICINE

## 2024-11-14 PROCEDURE — G0439 PPPS, SUBSEQ VISIT: HCPCS | Performed by: FAMILY MEDICINE

## 2024-11-14 PROCEDURE — 99397 PER PM REEVAL EST PAT 65+ YR: CPT | Performed by: FAMILY MEDICINE

## 2024-11-14 PROCEDURE — 1170F FXNL STATUS ASSESSED: CPT | Performed by: FAMILY MEDICINE

## 2024-11-14 PROCEDURE — 3079F DIAST BP 80-89 MM HG: CPT | Performed by: FAMILY MEDICINE

## 2024-11-14 ASSESSMENT — PATIENT HEALTH QUESTIONNAIRE - PHQ9
1. LITTLE INTEREST OR PLEASURE IN DOING THINGS: NOT AT ALL
SUM OF ALL RESPONSES TO PHQ9 QUESTIONS 1 AND 2: 0
2. FEELING DOWN, DEPRESSED OR HOPELESS: NOT AT ALL

## 2024-11-14 ASSESSMENT — ACTIVITIES OF DAILY LIVING (ADL)
MANAGING_FINANCES: INDEPENDENT
DRESSING: INDEPENDENT
BATHING: INDEPENDENT
GROCERY_SHOPPING: INDEPENDENT
DOING_HOUSEWORK: INDEPENDENT
TAKING_MEDICATION: INDEPENDENT

## 2024-11-14 ASSESSMENT — ENCOUNTER SYMPTOMS
CARDIOVASCULAR NEGATIVE: 1
ENDOCRINE NEGATIVE: 1
CONSTITUTIONAL NEGATIVE: 1
PSYCHIATRIC NEGATIVE: 1
NEUROLOGICAL NEGATIVE: 1
GASTROINTESTINAL NEGATIVE: 1
EYES NEGATIVE: 1
OCCASIONAL FEELINGS OF UNSTEADINESS: 0
RESPIRATORY NEGATIVE: 1
DEPRESSION: 0
LOSS OF SENSATION IN FEET: 0

## 2024-11-14 NOTE — PROGRESS NOTES
Subjective   Reason for Visit: Fadia Akbar is an 79 y.o. female here for a Medicare Wellness visit.          Reviewed all medications by prescribing practitioner or clinical pharmacist (such as prescriptions, OTCs, herbal therapies and supplements) and documented in the medical record.      Patient Care Team:  Temitope Sue DO as PCP - General  Ángel Hair MD as PCP - [employer]vantage PCP     Review of Systems   Constitutional: Negative.    HENT: Negative.     Eyes: Negative.    Respiratory: Negative.     Cardiovascular: Negative.    Gastrointestinal: Negative.    Endocrine: Negative.    Genitourinary: Negative.    Skin: Negative.    Neurological: Negative.    Psychiatric/Behavioral: Negative.         Objective   Vitals:  /82 (BP Location: Left arm, Patient Position: Sitting)   Pulse 62   Temp 36.6 °C (97.8 °F) (Temporal)   Resp 16   Ht 1.829 m (6')   Wt 103 kg (226 lb)   SpO2 98%   BMI 30.65 kg/m²       Physical Exam  Constitutional:       General: She is not in acute distress.     Appearance: Normal appearance.   HENT:      Head: Normocephalic and atraumatic.      Right Ear: Tympanic membrane normal.      Left Ear: Tympanic membrane normal.      Nose: Nose normal.      Mouth/Throat:      Mouth: Mucous membranes are dry.      Pharynx: Oropharynx is clear.   Eyes:      Conjunctiva/sclera: Conjunctivae normal.      Pupils: Pupils are equal, round, and reactive to light.   Neck:      Vascular: No carotid bruit.   Cardiovascular:      Rate and Rhythm: Normal rate and regular rhythm.      Heart sounds: Normal heart sounds.   Pulmonary:      Effort: Pulmonary effort is normal.      Breath sounds: Normal breath sounds.   Abdominal:      General: Bowel sounds are normal.      Palpations: Abdomen is soft.   Musculoskeletal:      Cervical back: Neck supple. No tenderness.   Skin:     General: Skin is warm and dry.   Neurological:      General: No focal deficit present.      Mental  Status: She is alert.   Psychiatric:         Mood and Affect: Mood normal.         Behavior: Behavior normal.         Assessment & Plan  Need for immunization against influenza    Orders:    Flu vaccine, trivalent, preservative free, HIGH-DOSE, age 65y+ (Fluzone)    Healthcare maintenance         Type 2 diabetes mellitus without complication, without long-term current use of insulin (Multi)    Orders:    POCT glycosylated hemoglobin (Hb A1C) manually resulted    ECG 12 lead (Clinic Performed)    Albumin-Creatinine Ratio, Urine Random; Future    CBC; Future    Comprehensive Metabolic Panel; Future    Hemoglobin A1C; Standing    Routine general medical examination at health care facility    Orders:    1 Year Follow Up In [facility] - PCP - Wellness Exam; Future

## 2024-11-15 ENCOUNTER — LAB (OUTPATIENT)
Dept: LAB | Facility: LAB | Age: 79
End: 2024-11-15
Payer: MEDICARE

## 2024-11-15 DIAGNOSIS — E11.9 TYPE 2 DIABETES MELLITUS WITHOUT COMPLICATION, WITHOUT LONG-TERM CURRENT USE OF INSULIN (MULTI): ICD-10-CM

## 2024-11-15 LAB
ALBUMIN SERPL BCP-MCNC: 4.2 G/DL (ref 3.4–5)
ALP SERPL-CCNC: 73 U/L (ref 33–136)
ALT SERPL W P-5'-P-CCNC: 16 U/L (ref 7–45)
ANION GAP SERPL CALC-SCNC: 14 MMOL/L (ref 10–20)
AST SERPL W P-5'-P-CCNC: 17 U/L (ref 9–39)
BILIRUB SERPL-MCNC: 0.9 MG/DL (ref 0–1.2)
BUN SERPL-MCNC: 19 MG/DL (ref 6–23)
CALCIUM SERPL-MCNC: 9.6 MG/DL (ref 8.6–10.6)
CHLORIDE SERPL-SCNC: 99 MMOL/L (ref 98–107)
CO2 SERPL-SCNC: 31 MMOL/L (ref 21–32)
CREAT SERPL-MCNC: 1.31 MG/DL (ref 0.5–1.05)
EGFRCR SERPLBLD CKD-EPI 2021: 42 ML/MIN/1.73M*2
ERYTHROCYTE [DISTWIDTH] IN BLOOD BY AUTOMATED COUNT: 12.8 % (ref 11.5–14.5)
EST. AVERAGE GLUCOSE BLD GHB EST-MCNC: 117 MG/DL
GLUCOSE SERPL-MCNC: 94 MG/DL (ref 74–99)
HBA1C MFR BLD: 5.7 %
HCT VFR BLD AUTO: 42.6 % (ref 36–46)
HGB BLD-MCNC: 14.2 G/DL (ref 12–16)
MCH RBC QN AUTO: 29.6 PG (ref 26–34)
MCHC RBC AUTO-ENTMCNC: 33.3 G/DL (ref 32–36)
MCV RBC AUTO: 89 FL (ref 80–100)
NRBC BLD-RTO: 0 /100 WBCS (ref 0–0)
PLATELET # BLD AUTO: 271 X10*3/UL (ref 150–450)
POTASSIUM SERPL-SCNC: 4.1 MMOL/L (ref 3.5–5.3)
PROT SERPL-MCNC: 7 G/DL (ref 6.4–8.2)
RBC # BLD AUTO: 4.8 X10*6/UL (ref 4–5.2)
SODIUM SERPL-SCNC: 140 MMOL/L (ref 136–145)
WBC # BLD AUTO: 8.4 X10*3/UL (ref 4.4–11.3)

## 2024-11-15 PROCEDURE — 82043 UR ALBUMIN QUANTITATIVE: CPT

## 2024-11-15 PROCEDURE — 85027 COMPLETE CBC AUTOMATED: CPT

## 2024-11-15 PROCEDURE — 82570 ASSAY OF URINE CREATININE: CPT

## 2024-11-15 PROCEDURE — 83036 HEMOGLOBIN GLYCOSYLATED A1C: CPT

## 2024-11-15 PROCEDURE — 80053 COMPREHEN METABOLIC PANEL: CPT

## 2024-11-15 PROCEDURE — 36415 COLL VENOUS BLD VENIPUNCTURE: CPT

## 2024-11-16 LAB
CREAT UR-MCNC: 129.5 MG/DL (ref 20–320)
MICROALBUMIN UR-MCNC: 62.9 MG/L
MICROALBUMIN/CREAT UR: 48.6 UG/MG CREAT

## 2024-11-27 DIAGNOSIS — F51.01 PRIMARY INSOMNIA: Primary | ICD-10-CM

## 2024-11-27 RX ORDER — TRAZODONE HYDROCHLORIDE 50 MG/1
TABLET ORAL
Qty: 30 TABLET | Refills: 0 | Status: SHIPPED | OUTPATIENT
Start: 2024-11-27

## 2024-12-16 DIAGNOSIS — I10 BENIGN ESSENTIAL HTN: ICD-10-CM

## 2024-12-16 DIAGNOSIS — Z00.00 ROUTINE GENERAL MEDICAL EXAMINATION AT HEALTH CARE FACILITY: ICD-10-CM

## 2024-12-16 DIAGNOSIS — Z00.00 HEALTHCARE MAINTENANCE: ICD-10-CM

## 2024-12-16 RX ORDER — ATENOLOL AND CHLORTHALIDONE TABLET 50; 25 MG/1; MG/1
1 TABLET ORAL DAILY
Qty: 30 TABLET | Refills: 0 | Status: SHIPPED | OUTPATIENT
Start: 2024-12-16

## 2025-01-08 ENCOUNTER — OFFICE VISIT (OUTPATIENT)
Facility: CLINIC | Age: 80
End: 2025-01-08
Payer: MEDICARE

## 2025-01-08 VITALS
HEART RATE: 64 BPM | DIASTOLIC BLOOD PRESSURE: 82 MMHG | OXYGEN SATURATION: 97 % | RESPIRATION RATE: 18 BRPM | BODY MASS INDEX: 29.61 KG/M2 | WEIGHT: 218.6 LBS | HEIGHT: 72 IN | TEMPERATURE: 98.6 F | SYSTOLIC BLOOD PRESSURE: 134 MMHG

## 2025-01-08 DIAGNOSIS — R19.7 DIARRHEA OF PRESUMED INFECTIOUS ORIGIN: Primary | ICD-10-CM

## 2025-01-08 PROCEDURE — 1036F TOBACCO NON-USER: CPT | Performed by: FAMILY MEDICINE

## 2025-01-08 PROCEDURE — 3079F DIAST BP 80-89 MM HG: CPT | Performed by: FAMILY MEDICINE

## 2025-01-08 PROCEDURE — 99213 OFFICE O/P EST LOW 20 MIN: CPT | Performed by: FAMILY MEDICINE

## 2025-01-08 PROCEDURE — 3075F SYST BP GE 130 - 139MM HG: CPT | Performed by: FAMILY MEDICINE

## 2025-01-08 PROCEDURE — 1123F ACP DISCUSS/DSCN MKR DOCD: CPT | Performed by: FAMILY MEDICINE

## 2025-01-08 PROCEDURE — 1159F MED LIST DOCD IN RCRD: CPT | Performed by: FAMILY MEDICINE

## 2025-01-08 ASSESSMENT — ENCOUNTER SYMPTOMS
SHORTNESS OF BREATH: 1
WHEEZING: 0
JOINT SWELLING: 0
HEADACHES: 0
EYE PAIN: 0
DIARRHEA: 1
BACK PAIN: 0
WEAKNESS: 1
SINUS PAIN: 0
SINUS PRESSURE: 0
COUGH: 0
EYE ITCHING: 0
ABDOMINAL PAIN: 1
EYE REDNESS: 0
DIFFICULTY URINATING: 0
CHILLS: 1
BLOOD IN STOOL: 0
SORE THROAT: 0
DYSURIA: 0
VOMITING: 1
FEVER: 0
TREMORS: 0
FATIGUE: 0
NAUSEA: 1

## 2025-01-08 NOTE — PROGRESS NOTES
Subjective     Patient ID: Fadia Akbar is a 79 y.o. female who presents for Fatigue, Vomiting, and Diarrhea.  Patient started having severe abdominal pain four days ago, and she was having both vomiting and diarrhea. She vomited once for approx. 40 minutes, and she was having 4-5 episodes of watery diarrhea a day. She has also been fatigued.      Review of Systems   Constitutional:  Positive for chills. Negative for fatigue and fever.   HENT:  Negative for congestion, sinus pressure, sinus pain and sore throat.    Eyes:  Negative for pain, redness and itching.   Respiratory:  Positive for shortness of breath. Negative for cough and wheezing.    Cardiovascular:  Negative for chest pain and leg swelling.   Gastrointestinal:  Positive for abdominal pain, diarrhea, nausea and vomiting. Negative for blood in stool.   Genitourinary:  Negative for difficulty urinating and dysuria.   Musculoskeletal:  Negative for back pain and joint swelling.   Neurological:  Positive for weakness. Negative for tremors and headaches.     Objective     Vitals:    01/08/25 1615   BP: 134/82   BP Location: Right arm   Patient Position: Sitting   Pulse: 64   Resp: 18   Temp: 37 °C (98.6 °F)   SpO2: 97%   Weight: 99.2 kg (218 lb 9.6 oz)   Height: 1.829 m (6')        Current Outpatient Medications   Medication Instructions    atenoloL-chlorthalidone (Tenoretic) 50-25 mg tablet 1 tablet, oral, Daily    atenoloL-chlorthalidone (Tenoretic) 50-25 mg tablet 1 tablet, oral, Daily    FLUoxetine (PROZAC) 20 mg, oral, Daily    levothyroxine (SYNTHROID, LEVOXYL) 75 mcg, oral, Daily    potassium chloride CR 20 mEq ER tablet 20 mEq, oral, Daily, do not crush chew or split    traZODone (Desyrel) 50 mg tablet TAKE ONE TABLET (50 MG) BY MOUTH ONCE DAILY AT BEDTIME    triamcinolone (Kenalog) 0.1 % cream Topical, 2 times daily PRN        Physical Exam  Constitutional:       Appearance: Normal appearance.   HENT:      Head: Normocephalic and atraumatic.       Right Ear: External ear normal.      Left Ear: External ear normal.      Nose: Nose normal.      Mouth/Throat:      Mouth: Mucous membranes are moist.      Pharynx: Oropharynx is clear.   Eyes:      Pupils: Pupils are equal, round, and reactive to light.   Cardiovascular:      Rate and Rhythm: Normal rate and regular rhythm.      Pulses: Normal pulses.      Heart sounds: Normal heart sounds.   Pulmonary:      Effort: Pulmonary effort is normal.      Breath sounds: Normal breath sounds.   Abdominal:      General: Abdomen is flat. Bowel sounds are normal.      Palpations: Abdomen is soft.   Musculoskeletal:         General: Normal range of motion.      Cervical back: Normal range of motion.   Skin:     General: Skin is warm and dry.   Neurological:      General: No focal deficit present.      Mental Status: She is alert and oriented to person, place, and time.   Psychiatric:         Mood and Affect: Mood normal.       Assessment/Plan   Diagnoses and all orders for this visit:  Diarrhea of presumed infectious origin  -     C. difficile, PCR; Future  -     Stool Pathogen Panel, PCR; Future  -     Occult Blood, Stool; Future

## 2025-01-09 DIAGNOSIS — E87.6 HYPOKALEMIA: ICD-10-CM

## 2025-01-09 RX ORDER — POTASSIUM CHLORIDE 1500 MG/1
20 TABLET, EXTENDED RELEASE ORAL DAILY
Qty: 30 TABLET | Refills: 0 | Status: SHIPPED | OUTPATIENT
Start: 2025-01-09

## 2025-01-13 DIAGNOSIS — F51.01 PRIMARY INSOMNIA: ICD-10-CM

## 2025-01-13 DIAGNOSIS — F32.A DEPRESSION, UNSPECIFIED DEPRESSION TYPE: ICD-10-CM

## 2025-01-13 DIAGNOSIS — Z00.00 HEALTHCARE MAINTENANCE: ICD-10-CM

## 2025-01-13 DIAGNOSIS — I10 BENIGN ESSENTIAL HTN: ICD-10-CM

## 2025-01-13 DIAGNOSIS — Z00.00 ROUTINE GENERAL MEDICAL EXAMINATION AT HEALTH CARE FACILITY: ICD-10-CM

## 2025-01-13 RX ORDER — TRAZODONE HYDROCHLORIDE 50 MG/1
50 TABLET ORAL NIGHTLY
Qty: 90 TABLET | Refills: 0 | Status: SHIPPED | OUTPATIENT
Start: 2025-01-13

## 2025-01-13 RX ORDER — FLUOXETINE HYDROCHLORIDE 20 MG/1
20 CAPSULE ORAL DAILY
Qty: 90 CAPSULE | Refills: 0 | Status: SHIPPED | OUTPATIENT
Start: 2025-01-13

## 2025-01-13 RX ORDER — ATENOLOL AND CHLORTHALIDONE TABLET 50; 25 MG/1; MG/1
1 TABLET ORAL DAILY
Qty: 90 TABLET | Refills: 0 | Status: SHIPPED | OUTPATIENT
Start: 2025-01-13

## 2025-02-13 DIAGNOSIS — E11.9 TYPE 2 DIABETES MELLITUS WITHOUT COMPLICATION, WITHOUT LONG-TERM CURRENT USE OF INSULIN (MULTI): Primary | ICD-10-CM

## 2025-02-14 ENCOUNTER — APPOINTMENT (OUTPATIENT)
Facility: CLINIC | Age: 80
End: 2025-02-14
Payer: MEDICARE

## 2025-03-12 DIAGNOSIS — E03.9 HYPOTHYROIDISM, UNSPECIFIED TYPE: ICD-10-CM

## 2025-03-12 DIAGNOSIS — E87.6 HYPOKALEMIA: Primary | ICD-10-CM

## 2025-03-12 RX ORDER — POTASSIUM CHLORIDE 20 MEQ/1
20 TABLET, EXTENDED RELEASE ORAL DAILY
Qty: 30 TABLET | Refills: 0 | Status: SHIPPED | OUTPATIENT
Start: 2025-03-12

## 2025-03-12 RX ORDER — LEVOTHYROXINE SODIUM 75 UG/1
75 TABLET ORAL DAILY
Qty: 90 TABLET | Refills: 0 | Status: SHIPPED | OUTPATIENT
Start: 2025-03-12

## 2025-03-20 ENCOUNTER — HOSPITAL ENCOUNTER (EMERGENCY)
Facility: HOSPITAL | Age: 80
Discharge: HOME | End: 2025-03-20
Attending: EMERGENCY MEDICINE
Payer: MEDICARE

## 2025-03-20 VITALS
SYSTOLIC BLOOD PRESSURE: 145 MMHG | RESPIRATION RATE: 18 BRPM | DIASTOLIC BLOOD PRESSURE: 73 MMHG | WEIGHT: 218 LBS | BODY MASS INDEX: 29.53 KG/M2 | OXYGEN SATURATION: 98 % | HEART RATE: 52 BPM | TEMPERATURE: 99 F | HEIGHT: 72 IN

## 2025-03-20 DIAGNOSIS — R04.0 EPISTAXIS: Primary | ICD-10-CM

## 2025-03-20 PROCEDURE — 99283 EMERGENCY DEPT VISIT LOW MDM: CPT | Performed by: EMERGENCY MEDICINE

## 2025-03-20 PROCEDURE — 2500000001 HC RX 250 WO HCPCS SELF ADMINISTERED DRUGS (ALT 637 FOR MEDICARE OP): Performed by: EMERGENCY MEDICINE

## 2025-03-20 RX ORDER — ATENOLOL 25 MG/1
25 TABLET ORAL ONCE
Status: COMPLETED | OUTPATIENT
Start: 2025-03-20 | End: 2025-03-20

## 2025-03-20 RX ADMIN — ATENOLOL 25 MG: 25 TABLET ORAL at 08:52

## 2025-03-20 ASSESSMENT — LIFESTYLE VARIABLES
TOTAL SCORE: 0
EVER FELT BAD OR GUILTY ABOUT YOUR DRINKING: NO
HAVE YOU EVER FELT YOU SHOULD CUT DOWN ON YOUR DRINKING: NO
EVER HAD A DRINK FIRST THING IN THE MORNING TO STEADY YOUR NERVES TO GET RID OF A HANGOVER: NO
HAVE PEOPLE ANNOYED YOU BY CRITICIZING YOUR DRINKING: NO

## 2025-03-20 ASSESSMENT — COLUMBIA-SUICIDE SEVERITY RATING SCALE - C-SSRS
6. HAVE YOU EVER DONE ANYTHING, STARTED TO DO ANYTHING, OR PREPARED TO DO ANYTHING TO END YOUR LIFE?: NO
1. IN THE PAST MONTH, HAVE YOU WISHED YOU WERE DEAD OR WISHED YOU COULD GO TO SLEEP AND NOT WAKE UP?: NO
2. HAVE YOU ACTUALLY HAD ANY THOUGHTS OF KILLING YOURSELF?: NO

## 2025-03-20 ASSESSMENT — PAIN SCALES - GENERAL
PAINLEVEL_OUTOF10: 0 - NO PAIN
PAINLEVEL_OUTOF10: 0 - NO PAIN

## 2025-03-20 ASSESSMENT — PAIN - FUNCTIONAL ASSESSMENT
PAIN_FUNCTIONAL_ASSESSMENT: 0-10
PAIN_FUNCTIONAL_ASSESSMENT: 0-10

## 2025-03-20 NOTE — ED PROVIDER NOTES
HPI   Chief Complaint   Patient presents with    Epistaxis (Nose Bleed)       Patient is an 80-year-old female with a history of type 2 diabetes and hypertension coming in with epistaxis.  Patient states that she woke up and had a lot of blood on her sheets.  This is the second or third episode that has happened this week so her daughter convinced her to come in for evaluation.  She has a history of requiring an ablation with ENT for epistaxis previously.  She has not not on blood thinners.  She did not take her atenolol this morning.  She arrives hypertensive but not having any bleeding.  She would like to go home.  She lives close.  No trauma.  No hemoptysis or hematemesis.                        Jack Coma Scale Score: 15                    Patient History   No past medical history on file.  Past Surgical History:   Procedure Laterality Date    OTHER SURGICAL HISTORY  06/15/2022    Cataract surgery     No family history on file.  Social History     Tobacco Use    Smoking status: Never    Smokeless tobacco: Never   Vaping Use    Vaping status: Never Used   Substance Use Topics    Alcohol use: Never    Drug use: Never       Physical Exam   Visit Vitals  BP (!) 190/84 (BP Location: Right arm, Patient Position: Sitting)   Pulse 54   Temp 37.2 °C (99 °F) (Temporal)   Resp 18   Ht 1.829 m (6')   Wt 98.9 kg (218 lb)   SpO2 97%   BMI 29.57 kg/m²   OB Status Postmenopausal   Smoking Status Never   BSA 2.24 m²       Physical Exam  Vitals and nursing note reviewed.   Constitutional:       General: She is not in acute distress.     Appearance: She is well-developed.   HENT:      Head: Normocephalic and atraumatic.      Nose: Nose normal. No congestion or rhinorrhea.      Comments: No bleeding.  Nares clear.  Eyes:      Conjunctiva/sclera: Conjunctivae normal.   Cardiovascular:      Rate and Rhythm: Normal rate and regular rhythm.      Heart sounds: No murmur heard.  Pulmonary:      Effort: Pulmonary effort is normal. No  respiratory distress.      Breath sounds: Normal breath sounds.   Abdominal:      Palpations: Abdomen is soft.      Tenderness: There is no abdominal tenderness.   Musculoskeletal:         General: No swelling.      Cervical back: Neck supple.   Skin:     General: Skin is warm and dry.      Capillary Refill: Capillary refill takes less than 2 seconds.   Neurological:      Mental Status: She is alert.   Psychiatric:         Mood and Affect: Mood normal.         Procedure  Procedures    Medical Decision Making  Patient is an 80-year-old female coming in with epistaxis that has resolved.  I am giving her a dose of her morning atenolol, 25 mg, since she has not yet taken this morning.  Right now she is asymptomatic and not bleeding.  I gave her close return precautions and told her to  some Afrin on her way home which she can use if needed if her epistaxis recurs.  We talked about how to apply pressure for 10 to 15 minutes and that she should come back to the emergency department if her bleeding does not resolve.  She is also going to follow-up with an ENT since she has had multiple episodes in the last 1 to 2 weeks.  No evidence of trauma versus infection versus anemia here today on exam.        ED Course & MDM     Diagnoses as of 03/20/25 0850   Epistaxis       ** Please excuse any errors in grammar or translation related to this dictation. Voice recognition software was utilized to prepare this document. **       Ramon Hurtado MD  [facility] Emergency Medicine      Ramon Hurtado MD  03/20/25 0869

## 2025-03-20 NOTE — ED TRIAGE NOTES
Pt states she woke up to a bloody nose, her bed sheets and pajamas were soaked in blood, pt pinched her nose for about ten minutes and it stopped. Pt states she also had a bloody nose yesterday and about a week ago. No blood thinners. Does take tylenol and ibuprofen everyday for chronic knee pain.

## 2025-04-08 ENCOUNTER — APPOINTMENT (OUTPATIENT)
Facility: CLINIC | Age: 80
End: 2025-04-08
Payer: MEDICARE

## 2025-04-08 VITALS
WEIGHT: 218 LBS | TEMPERATURE: 96.3 F | HEIGHT: 72 IN | BODY MASS INDEX: 29.53 KG/M2 | DIASTOLIC BLOOD PRESSURE: 74 MMHG | SYSTOLIC BLOOD PRESSURE: 136 MMHG

## 2025-04-08 DIAGNOSIS — R04.0 EPISTAXIS: ICD-10-CM

## 2025-04-08 DIAGNOSIS — J34.89 NASAL MUCOSA DRY: Primary | ICD-10-CM

## 2025-04-08 PROCEDURE — 1036F TOBACCO NON-USER: CPT | Performed by: OTOLARYNGOLOGY

## 2025-04-08 PROCEDURE — 3078F DIAST BP <80 MM HG: CPT | Performed by: OTOLARYNGOLOGY

## 2025-04-08 PROCEDURE — 1123F ACP DISCUSS/DSCN MKR DOCD: CPT | Performed by: OTOLARYNGOLOGY

## 2025-04-08 PROCEDURE — 99204 OFFICE O/P NEW MOD 45 MIN: CPT | Performed by: OTOLARYNGOLOGY

## 2025-04-08 PROCEDURE — 3075F SYST BP GE 130 - 139MM HG: CPT | Performed by: OTOLARYNGOLOGY

## 2025-04-08 PROCEDURE — 1159F MED LIST DOCD IN RCRD: CPT | Performed by: OTOLARYNGOLOGY

## 2025-04-08 PROCEDURE — 1160F RVW MEDS BY RX/DR IN RCRD: CPT | Performed by: OTOLARYNGOLOGY

## 2025-04-08 NOTE — PROGRESS NOTES
Impression:  1. Nasal mucosa dry        2. Epistaxis  Referral to ENT           RECOMMENDATIONS/PLAN :  I reassured the patient there is no evidence of any prominent blood vessels to cauterize today.  I do want her to start using saline spray in the nose several times daily to keep everything moist.  She must stop using the Afrin nasal spray otherwise this will cause rebound congestion.  I gave her a handout on how to stop bleeding with cotton balls and Afrin spray if needed.  She can otherwise follow-up as needed.      **This electronic medical record note was created with the use of voice recognition software.  Despite proofreading, typographical or grammatical errors may be present that could affect meaning of content **    Subjective   Patient ID:     Fadia Akbar is a 80 y.o. female who presents to the office today after she had severe bleeding from her right nostril.  This occurred a few weeks ago.  She denies issues with elevated blood pressure.  No history of any bleeding disorder.  She denies any trauma to the nose or face.  No infectious drainage fever or chills.    ROS:  A detailed 12 system review of systems is noted on the intake form has been reviewed with the patient with details noted in the HPI and scanned into the patient's medical record.    Objective     History reviewed. No pertinent past medical history.     Past Surgical History:   Procedure Laterality Date    OTHER SURGICAL HISTORY  06/15/2022    Cataract surgery        Allergies   Allergen Reactions    Penicillins Swelling and Hives          Current Outpatient Medications:     atenoloL-chlorthalidone (Tenoretic) 50-25 mg tablet, Take 1 tablet by mouth once daily., Disp: 30 tablet, Rfl: 0    FLUoxetine (PROzac) 20 mg capsule, Take 1 capsule (20 mg) by mouth once daily., Disp: 90 capsule, Rfl: 0    levothyroxine (Synthroid, Levoxyl) 75 mcg tablet, Take 1 tablet (75 mcg) by mouth once daily., Disp: 90 tablet, Rfl: 0    potassium chloride CR  20 mEq ER tablet, Take 1 tablet (20 mEq) by mouth once daily. do not crush chew or split, Disp: 30 tablet, Rfl: 0    traZODone (Desyrel) 50 mg tablet, Take 1 tablet (50 mg) by mouth once daily at bedtime., Disp: 90 tablet, Rfl: 0    triamcinolone (Kenalog) 0.1 % cream, Apply topically 2 times a day as needed for rash., Disp: 80 g, Rfl: 1    atenoloL-chlorthalidone (Tenoretic) 50-25 mg tablet, Take 1 tablet by mouth once daily., Disp: 90 tablet, Rfl: 0     Tobacco Use: Low Risk  (4/8/2025)    Patient History     Smoking Tobacco Use: Never     Smokeless Tobacco Use: Never     Passive Exposure: Not on file        Alcohol Use: Not on file        Social History     Substance and Sexual Activity   Drug Use Never        Physical Exam:  Visit Vitals  /74   Temp 35.7 °C (96.3 °F) (Temporal)   Ht 1.829 m (6')   Wt 98.9 kg (218 lb)   BMI 29.57 kg/m²   OB Status Postmenopausal   Smoking Status Never   BSA 2.24 m²      General: Patient is alert, oriented, cooperative in no apparent distress.  Head: Normocephalic, atraumatic.  Eyes: PERRL, EOMI, Conjunctiva is clear. No nystagmus.  Ears: Right Ear-- Pinna is normal.  External auditory canal is patent. Tympanic membrane is [intact, translucent and has good mobility with my pneumatic otoscope. No effusion].  Mastoid is nontender.  Left ear-- Pinna is normal.  External auditory canal is patent. Tympanic membrane is [intact, translucent and has good mobility with my pneumatic otoscope.  No effusion].  Mastoid is nontender.  Nose: Septum is relatively straight and the mucosa is mildly dry.  No evidence of any prominent blood vessels to cauterize today..  No septal perforation or lesions. No septal hematoma/ seroma.  No signs of bleeding.  Inferior turbinates are normal.   No evidence of intranasal polyps.  No infectious drainage.  Throat:  Floor of mouth is clear, no masses.  Tongue appears normal, no lesions or masses. Gums, gingiva, buccal mucosa appear pink and moist, no  lesions. Teeth are in good repair.  No obvious dental infections.  Peritonsillar regions appear symmetric without swelling.  Hard and soft palate appear normal, no obvious cleft. Uvula is midline.  Oropharynx: No lesions. Retropharyngeal wall is flat.  No active postnasal drip.  Neck: Supple,  no lymphadenopathy.  No masses.  Salivary Glands: Symmetric bilaterally.  No palpable masses.  No evidence of acute infection or salivary stones  Neurologic: Cranial Nerves 2-12 are grossly intact without focal deficits. Cerebellar function testing is normal.     Results:   []    Procedure:   []    Sandoval Walker, DO

## 2025-04-11 DIAGNOSIS — I10 BENIGN ESSENTIAL HTN: ICD-10-CM

## 2025-04-11 DIAGNOSIS — F32.A DEPRESSION, UNSPECIFIED DEPRESSION TYPE: ICD-10-CM

## 2025-04-11 DIAGNOSIS — Z00.00 ROUTINE GENERAL MEDICAL EXAMINATION AT HEALTH CARE FACILITY: ICD-10-CM

## 2025-04-11 DIAGNOSIS — Z00.00 HEALTHCARE MAINTENANCE: ICD-10-CM

## 2025-04-11 DIAGNOSIS — F51.01 PRIMARY INSOMNIA: ICD-10-CM

## 2025-04-11 RX ORDER — ATENOLOL AND CHLORTHALIDONE TABLET 50; 25 MG/1; MG/1
1 TABLET ORAL DAILY
Qty: 90 TABLET | Refills: 0 | Status: SHIPPED | OUTPATIENT
Start: 2025-04-11

## 2025-04-11 RX ORDER — FLUOXETINE HYDROCHLORIDE 20 MG/1
20 CAPSULE ORAL DAILY
Qty: 90 CAPSULE | Refills: 0 | Status: SHIPPED | OUTPATIENT
Start: 2025-04-11

## 2025-04-11 RX ORDER — TRAZODONE HYDROCHLORIDE 50 MG/1
50 TABLET ORAL NIGHTLY
Qty: 90 TABLET | Refills: 0 | Status: SHIPPED | OUTPATIENT
Start: 2025-04-11

## 2025-05-02 DIAGNOSIS — E11.9 TYPE 2 DIABETES MELLITUS WITHOUT COMPLICATION, WITHOUT LONG-TERM CURRENT USE OF INSULIN: ICD-10-CM

## 2025-07-03 ENCOUNTER — TELEPHONE (OUTPATIENT)
Facility: CLINIC | Age: 80
End: 2025-07-03
Payer: MEDICARE

## 2025-07-03 DIAGNOSIS — Z12.31 ENCOUNTER FOR SCREENING MAMMOGRAM FOR BREAST CANCER: Primary | ICD-10-CM

## 2025-07-24 DIAGNOSIS — E03.9 HYPOTHYROIDISM, UNSPECIFIED TYPE: ICD-10-CM

## 2025-07-24 DIAGNOSIS — F32.A DEPRESSION, UNSPECIFIED DEPRESSION TYPE: ICD-10-CM

## 2025-07-24 DIAGNOSIS — F51.01 PRIMARY INSOMNIA: ICD-10-CM

## 2025-07-24 DIAGNOSIS — Z00.00 HEALTHCARE MAINTENANCE: ICD-10-CM

## 2025-07-24 DIAGNOSIS — I10 BENIGN ESSENTIAL HTN: ICD-10-CM

## 2025-07-24 DIAGNOSIS — Z00.00 ROUTINE GENERAL MEDICAL EXAMINATION AT HEALTH CARE FACILITY: ICD-10-CM

## 2025-07-24 RX ORDER — LEVOTHYROXINE SODIUM 75 UG/1
75 TABLET ORAL DAILY
Qty: 90 TABLET | Refills: 0 | Status: SHIPPED | OUTPATIENT
Start: 2025-07-24 | End: 2025-07-24 | Stop reason: SDUPTHER

## 2025-07-24 RX ORDER — TRAZODONE HYDROCHLORIDE 50 MG/1
50 TABLET ORAL NIGHTLY
Qty: 90 TABLET | Refills: 0 | Status: SHIPPED | OUTPATIENT
Start: 2025-07-24 | End: 2025-07-24 | Stop reason: SDUPTHER

## 2025-07-24 RX ORDER — LEVOTHYROXINE SODIUM 75 UG/1
75 TABLET ORAL DAILY
Qty: 90 TABLET | Refills: 2 | Status: SHIPPED | OUTPATIENT
Start: 2025-07-24

## 2025-07-24 RX ORDER — FLUOXETINE 20 MG/1
20 CAPSULE ORAL DAILY
Qty: 90 CAPSULE | Refills: 0 | Status: SHIPPED | OUTPATIENT
Start: 2025-07-24 | End: 2025-07-24 | Stop reason: SDUPTHER

## 2025-07-24 RX ORDER — ATENOLOL AND CHLORTHALIDONE TABLET 50; 25 MG/1; MG/1
1 TABLET ORAL DAILY
Qty: 90 TABLET | Refills: 0 | Status: SHIPPED | OUTPATIENT
Start: 2025-07-24

## 2025-07-24 RX ORDER — TRAZODONE HYDROCHLORIDE 50 MG/1
50 TABLET ORAL NIGHTLY
Qty: 90 TABLET | Refills: 1 | Status: SHIPPED | OUTPATIENT
Start: 2025-07-24

## 2025-07-24 RX ORDER — FLUOXETINE 20 MG/1
20 CAPSULE ORAL DAILY
Qty: 90 CAPSULE | Refills: 2 | Status: SHIPPED | OUTPATIENT
Start: 2025-07-24

## 2025-07-25 DIAGNOSIS — E11.9 TYPE 2 DIABETES MELLITUS WITHOUT COMPLICATION, WITHOUT LONG-TERM CURRENT USE OF INSULIN: ICD-10-CM

## 2025-07-30 ENCOUNTER — APPOINTMENT (OUTPATIENT)
Dept: RADIOLOGY | Facility: HOSPITAL | Age: 80
End: 2025-07-30
Payer: MEDICARE

## 2025-07-30 ENCOUNTER — TELEPHONE (OUTPATIENT)
Facility: CLINIC | Age: 80
End: 2025-07-30
Payer: MEDICARE

## 2025-07-30 ENCOUNTER — APPOINTMENT (OUTPATIENT)
Dept: CARDIOLOGY | Facility: HOSPITAL | Age: 80
End: 2025-07-30
Payer: MEDICARE

## 2025-07-30 ENCOUNTER — HOSPITAL ENCOUNTER (OUTPATIENT)
Facility: HOSPITAL | Age: 80
Setting detail: OBSERVATION
Discharge: HOME | End: 2025-07-31
Attending: STUDENT IN AN ORGANIZED HEALTH CARE EDUCATION/TRAINING PROGRAM
Payer: MEDICARE

## 2025-07-30 DIAGNOSIS — R29.90 STROKE-LIKE SYMPTOMS: ICD-10-CM

## 2025-07-30 DIAGNOSIS — R47.01 APHASIA: ICD-10-CM

## 2025-07-30 DIAGNOSIS — R47.81 SLURRED SPEECH: Primary | ICD-10-CM

## 2025-07-30 DIAGNOSIS — G45.9 TRANSIENT CEREBRAL ISCHEMIA, UNSPECIFIED TYPE: ICD-10-CM

## 2025-07-30 DIAGNOSIS — R94.31 ABNORMAL ELECTROCARDIOGRAM (ECG) (EKG): ICD-10-CM

## 2025-07-30 DIAGNOSIS — E87.6 HYPOKALEMIA: ICD-10-CM

## 2025-07-30 DIAGNOSIS — Z00.00 HEALTHCARE MAINTENANCE: ICD-10-CM

## 2025-07-30 DIAGNOSIS — Z00.00 ROUTINE GENERAL MEDICAL EXAMINATION AT HEALTH CARE FACILITY: ICD-10-CM

## 2025-07-30 DIAGNOSIS — I10 BENIGN ESSENTIAL HTN: ICD-10-CM

## 2025-07-30 LAB
ALBUMIN SERPL BCP-MCNC: 4.6 G/DL (ref 3.4–5)
ALP SERPL-CCNC: 78 U/L (ref 33–136)
ALT SERPL W P-5'-P-CCNC: 16 U/L (ref 7–45)
ANION GAP SERPL CALC-SCNC: 16 MMOL/L (ref 10–20)
AST SERPL W P-5'-P-CCNC: 20 U/L (ref 9–39)
BASOPHILS # BLD AUTO: 0.05 X10*3/UL (ref 0–0.1)
BASOPHILS NFR BLD AUTO: 0.5 %
BILIRUB SERPL-MCNC: 0.7 MG/DL (ref 0–1.2)
BUN SERPL-MCNC: 12 MG/DL (ref 6–23)
CALCIUM SERPL-MCNC: 9.7 MG/DL (ref 8.6–10.3)
CARDIAC TROPONIN I PNL SERPL HS: 8 NG/L (ref 0–13)
CHLORIDE SERPL-SCNC: 96 MMOL/L (ref 98–107)
CO2 SERPL-SCNC: 30 MMOL/L (ref 21–32)
CREAT SERPL-MCNC: 1.29 MG/DL (ref 0.5–1.05)
EGFRCR SERPLBLD CKD-EPI 2021: 42 ML/MIN/1.73M*2
EOSINOPHIL # BLD AUTO: 0.23 X10*3/UL (ref 0–0.4)
EOSINOPHIL NFR BLD AUTO: 2.2 %
ERYTHROCYTE [DISTWIDTH] IN BLOOD BY AUTOMATED COUNT: 13.2 % (ref 11.5–14.5)
GLUCOSE SERPL-MCNC: 103 MG/DL (ref 74–99)
HCT VFR BLD AUTO: 45.5 % (ref 36–46)
HGB BLD-MCNC: 15.5 G/DL (ref 12–16)
IMM GRANULOCYTES # BLD AUTO: 0.01 X10*3/UL (ref 0–0.5)
IMM GRANULOCYTES NFR BLD AUTO: 0.1 % (ref 0–0.9)
LYMPHOCYTES # BLD AUTO: 2.61 X10*3/UL (ref 0.8–3)
LYMPHOCYTES NFR BLD AUTO: 25.5 %
MCH RBC QN AUTO: 28.9 PG (ref 26–34)
MCHC RBC AUTO-ENTMCNC: 34.1 G/DL (ref 32–36)
MCV RBC AUTO: 85 FL (ref 80–100)
MONOCYTES # BLD AUTO: 0.82 X10*3/UL (ref 0.05–0.8)
MONOCYTES NFR BLD AUTO: 8 %
NEUTROPHILS # BLD AUTO: 6.52 X10*3/UL (ref 1.6–5.5)
NEUTROPHILS NFR BLD AUTO: 63.7 %
NRBC BLD-RTO: 0 /100 WBCS (ref 0–0)
PLATELET # BLD AUTO: 280 X10*3/UL (ref 150–450)
POTASSIUM SERPL-SCNC: 3 MMOL/L (ref 3.5–5.3)
PROT SERPL-MCNC: 8.2 G/DL (ref 6.4–8.2)
RBC # BLD AUTO: 5.36 X10*6/UL (ref 4–5.2)
SODIUM SERPL-SCNC: 139 MMOL/L (ref 136–145)
WBC # BLD AUTO: 10.2 X10*3/UL (ref 4.4–11.3)

## 2025-07-30 PROCEDURE — 80053 COMPREHEN METABOLIC PANEL: CPT

## 2025-07-30 PROCEDURE — 71046 X-RAY EXAM CHEST 2 VIEWS: CPT

## 2025-07-30 PROCEDURE — 93005 ELECTROCARDIOGRAM TRACING: CPT

## 2025-07-30 PROCEDURE — 99285 EMERGENCY DEPT VISIT HI MDM: CPT | Performed by: STUDENT IN AN ORGANIZED HEALTH CARE EDUCATION/TRAINING PROGRAM

## 2025-07-30 PROCEDURE — 71046 X-RAY EXAM CHEST 2 VIEWS: CPT | Performed by: RADIOLOGY

## 2025-07-30 PROCEDURE — 70450 CT HEAD/BRAIN W/O DYE: CPT

## 2025-07-30 PROCEDURE — 70450 CT HEAD/BRAIN W/O DYE: CPT | Performed by: RADIOLOGY

## 2025-07-30 PROCEDURE — 84484 ASSAY OF TROPONIN QUANT: CPT

## 2025-07-30 PROCEDURE — 85025 COMPLETE CBC W/AUTO DIFF WBC: CPT

## 2025-07-30 PROCEDURE — 36415 COLL VENOUS BLD VENIPUNCTURE: CPT

## 2025-07-30 PROCEDURE — 99285 EMERGENCY DEPT VISIT HI MDM: CPT | Mod: 25 | Performed by: STUDENT IN AN ORGANIZED HEALTH CARE EDUCATION/TRAINING PROGRAM

## 2025-07-30 ASSESSMENT — PAIN - FUNCTIONAL ASSESSMENT: PAIN_FUNCTIONAL_ASSESSMENT: 0-10

## 2025-07-30 ASSESSMENT — PAIN SCALES - GENERAL
PAINLEVEL_OUTOF10: 0 - NO PAIN
PAINLEVEL_OUTOF10: 0 - NO PAIN

## 2025-07-30 ASSESSMENT — LIFESTYLE VARIABLES
EVER FELT BAD OR GUILTY ABOUT YOUR DRINKING: NO
HAVE YOU EVER FELT YOU SHOULD CUT DOWN ON YOUR DRINKING: NO
TOTAL SCORE: 0
HAVE PEOPLE ANNOYED YOU BY CRITICIZING YOUR DRINKING: NO
EVER HAD A DRINK FIRST THING IN THE MORNING TO STEADY YOUR NERVES TO GET RID OF A HANGOVER: NO

## 2025-07-30 NOTE — TELEPHONE ENCOUNTER
FYI patient called without identifying herself and stated she is having trouble speaking. I immediately told her to go to the ER. I got her name and number by looking at the caller ID. Maria Victoria called her back.

## 2025-07-30 NOTE — ED TRIAGE NOTES
Patient report slurred speech that started 3 days ago. Patient has chronic droop on  right side of lower lip from a previous surgery

## 2025-07-31 ENCOUNTER — APPOINTMENT (OUTPATIENT)
Dept: CARDIOLOGY | Facility: HOSPITAL | Age: 80
End: 2025-07-31
Payer: MEDICARE

## 2025-07-31 ENCOUNTER — APPOINTMENT (OUTPATIENT)
Dept: RADIOLOGY | Facility: HOSPITAL | Age: 80
End: 2025-07-31
Payer: MEDICARE

## 2025-07-31 VITALS
SYSTOLIC BLOOD PRESSURE: 140 MMHG | RESPIRATION RATE: 14 BRPM | TEMPERATURE: 97.5 F | WEIGHT: 215 LBS | HEIGHT: 72 IN | HEART RATE: 56 BPM | BODY MASS INDEX: 29.12 KG/M2 | DIASTOLIC BLOOD PRESSURE: 77 MMHG | OXYGEN SATURATION: 97 %

## 2025-07-31 PROBLEM — R29.90 STROKE-LIKE SYMPTOMS: Status: ACTIVE | Noted: 2025-07-31

## 2025-07-31 LAB
ANION GAP SERPL CALC-SCNC: 15 MMOL/L (ref 10–20)
APPEARANCE UR: ABNORMAL
ATRIAL RATE: 52 BPM
BACTERIA #/AREA URNS AUTO: ABNORMAL /HPF
BILIRUB UR STRIP.AUTO-MCNC: NEGATIVE MG/DL
BNP SERPL-MCNC: 98 PG/ML (ref 0–99)
BODY SURFACE AREA: 2.23 M2
BUN SERPL-MCNC: 13 MG/DL (ref 6–23)
CALCIUM SERPL-MCNC: 9.5 MG/DL (ref 8.6–10.3)
CHLORIDE SERPL-SCNC: 99 MMOL/L (ref 98–107)
CHOLEST SERPL-MCNC: 151 MG/DL (ref 0–199)
CHOLESTEROL/HDL RATIO: 4.1
CO2 SERPL-SCNC: 30 MMOL/L (ref 21–32)
COLOR UR: YELLOW
CREAT SERPL-MCNC: 1.22 MG/DL (ref 0.5–1.05)
EGFRCR SERPLBLD CKD-EPI 2021: 45 ML/MIN/1.73M*2
EJECTION FRACTION: 63 %
EST. AVERAGE GLUCOSE BLD GHB EST-MCNC: 126 MG/DL
GLUCOSE SERPL-MCNC: 98 MG/DL (ref 74–99)
GLUCOSE UR STRIP.AUTO-MCNC: NORMAL MG/DL
HBA1C MFR BLD: 6 % (ref ?–5.7)
HDLC SERPL-MCNC: 36.9 MG/DL
HOLD SPECIMEN: NORMAL
HOLD SPECIMEN: NORMAL
KETONES UR STRIP.AUTO-MCNC: NEGATIVE MG/DL
LDLC SERPL CALC-MCNC: 96 MG/DL
LEFT VENTRICLE INTERNAL DIMENSION DIASTOLE: 4.58 CM (ref 3.5–6)
LEUKOCYTE ESTERASE UR QL STRIP.AUTO: ABNORMAL
MAGNESIUM SERPL-MCNC: 1.6 MG/DL (ref 1.6–2.4)
MITRAL VALVE E/A RATIO: 1.33
MUCOUS THREADS #/AREA URNS AUTO: ABNORMAL /LPF
NITRITE UR QL STRIP.AUTO: ABNORMAL
NON HDL CHOLESTEROL: 114 MG/DL (ref 0–149)
P AXIS: 64 DEGREES
P OFFSET: 190 MS
P ONSET: 134 MS
PH UR STRIP.AUTO: 6 [PH]
POTASSIUM SERPL-SCNC: 3.2 MMOL/L (ref 3.5–5.3)
PR INTERVAL: 180 MS
PROT UR STRIP.AUTO-MCNC: ABNORMAL MG/DL
Q ONSET: 224 MS
QRS COUNT: 8 BEATS
QRS DURATION: 90 MS
QT INTERVAL: 464 MS
QTC CALCULATION(BAZETT): 431 MS
QTC FREDERICIA: 442 MS
R AXIS: -34 DEGREES
RBC # UR STRIP.AUTO: ABNORMAL MG/DL
RBC #/AREA URNS AUTO: ABNORMAL /HPF
RIGHT VENTRICLE FREE WALL PEAK S': 13.02 CM/S
RIGHT VENTRICLE PEAK SYSTOLIC PRESSURE: 32 MMHG
SODIUM SERPL-SCNC: 141 MMOL/L (ref 136–145)
SP GR UR STRIP.AUTO: 1.02
T AXIS: 58 DEGREES
T OFFSET: 456 MS
TRICUSPID ANNULAR PLANE SYSTOLIC EXCURSION: 2.4 CM
TRIGL SERPL-MCNC: 89 MG/DL (ref 0–149)
UROBILINOGEN UR STRIP.AUTO-MCNC: NORMAL MG/DL
VENTRICULAR RATE: 52 BPM
VLDL: 18 MG/DL (ref 0–40)
WBC #/AREA URNS AUTO: >50 /HPF
WBC CLUMPS #/AREA URNS AUTO: ABNORMAL /HPF
YEAST BUDDING #/AREA UR COMP ASSIST: PRESENT /HPF

## 2025-07-31 PROCEDURE — 70547 MR ANGIOGRAPHY NECK W/O DYE: CPT

## 2025-07-31 PROCEDURE — 70544 MR ANGIOGRAPHY HEAD W/O DYE: CPT | Mod: 59

## 2025-07-31 PROCEDURE — 80048 BASIC METABOLIC PNL TOTAL CA: CPT | Performed by: NURSE PRACTITIONER

## 2025-07-31 PROCEDURE — 96372 THER/PROPH/DIAG INJ SC/IM: CPT | Mod: 59

## 2025-07-31 PROCEDURE — 83735 ASSAY OF MAGNESIUM: CPT | Performed by: NURSE PRACTITIONER

## 2025-07-31 PROCEDURE — 96375 TX/PRO/DX INJ NEW DRUG ADDON: CPT

## 2025-07-31 PROCEDURE — 2500000002 HC RX 250 W HCPCS SELF ADMINISTERED DRUGS (ALT 637 FOR MEDICARE OP, ALT 636 FOR OP/ED): Performed by: INTERNAL MEDICINE

## 2025-07-31 PROCEDURE — 70553 MRI BRAIN STEM W/O & W/DYE: CPT

## 2025-07-31 PROCEDURE — 2500000001 HC RX 250 WO HCPCS SELF ADMINISTERED DRUGS (ALT 637 FOR MEDICARE OP): Performed by: INTERNAL MEDICINE

## 2025-07-31 PROCEDURE — 96365 THER/PROPH/DIAG IV INF INIT: CPT | Mod: 59

## 2025-07-31 PROCEDURE — 2500000001 HC RX 250 WO HCPCS SELF ADMINISTERED DRUGS (ALT 637 FOR MEDICARE OP)

## 2025-07-31 PROCEDURE — 2500000004 HC RX 250 GENERAL PHARMACY W/ HCPCS (ALT 636 FOR OP/ED): Performed by: NURSE PRACTITIONER

## 2025-07-31 PROCEDURE — 93325 DOPPLER ECHO COLOR FLOW MAPG: CPT

## 2025-07-31 PROCEDURE — 94760 N-INVAS EAR/PLS OXIMETRY 1: CPT

## 2025-07-31 PROCEDURE — 93325 DOPPLER ECHO COLOR FLOW MAPG: CPT | Performed by: INTERNAL MEDICINE

## 2025-07-31 PROCEDURE — 93321 DOPPLER ECHO F-UP/LMTD STD: CPT | Performed by: INTERNAL MEDICINE

## 2025-07-31 PROCEDURE — 93308 TTE F-UP OR LMTD: CPT | Performed by: INTERNAL MEDICINE

## 2025-07-31 PROCEDURE — 70544 MR ANGIOGRAPHY HEAD W/O DYE: CPT | Performed by: RADIOLOGY

## 2025-07-31 PROCEDURE — 80061 LIPID PANEL: CPT

## 2025-07-31 PROCEDURE — G0378 HOSPITAL OBSERVATION PER HR: HCPCS

## 2025-07-31 PROCEDURE — 70547 MR ANGIOGRAPHY NECK W/O DYE: CPT | Performed by: RADIOLOGY

## 2025-07-31 PROCEDURE — 83036 HEMOGLOBIN GLYCOSYLATED A1C: CPT | Mod: STJLAB

## 2025-07-31 PROCEDURE — 2500000002 HC RX 250 W HCPCS SELF ADMINISTERED DRUGS (ALT 637 FOR MEDICARE OP, ALT 636 FOR OP/ED)

## 2025-07-31 PROCEDURE — 96367 TX/PROPH/DG ADDL SEQ IV INF: CPT

## 2025-07-31 PROCEDURE — 36415 COLL VENOUS BLD VENIPUNCTURE: CPT

## 2025-07-31 PROCEDURE — 99222 1ST HOSP IP/OBS MODERATE 55: CPT

## 2025-07-31 PROCEDURE — A9575 INJ GADOTERATE MEGLUMI 0.1ML: HCPCS | Performed by: INTERNAL MEDICINE

## 2025-07-31 PROCEDURE — 87077 CULTURE AEROBIC IDENTIFY: CPT | Mod: STJLAB

## 2025-07-31 PROCEDURE — 70553 MRI BRAIN STEM W/O & W/DYE: CPT | Performed by: RADIOLOGY

## 2025-07-31 PROCEDURE — 81001 URINALYSIS AUTO W/SCOPE: CPT

## 2025-07-31 PROCEDURE — 83880 ASSAY OF NATRIURETIC PEPTIDE: CPT

## 2025-07-31 PROCEDURE — 96366 THER/PROPH/DIAG IV INF ADDON: CPT

## 2025-07-31 PROCEDURE — 97161 PT EVAL LOW COMPLEX 20 MIN: CPT | Mod: GP

## 2025-07-31 PROCEDURE — 2550000001 HC RX 255 CONTRASTS: Performed by: INTERNAL MEDICINE

## 2025-07-31 PROCEDURE — 99223 1ST HOSP IP/OBS HIGH 75: CPT | Performed by: STUDENT IN AN ORGANIZED HEALTH CARE EDUCATION/TRAINING PROGRAM

## 2025-07-31 PROCEDURE — 93246 EXT ECG>7D<15D RECORDING: CPT

## 2025-07-31 PROCEDURE — 2500000002 HC RX 250 W HCPCS SELF ADMINISTERED DRUGS (ALT 637 FOR MEDICARE OP, ALT 636 FOR OP/ED): Performed by: NURSE PRACTITIONER

## 2025-07-31 PROCEDURE — 2500000004 HC RX 250 GENERAL PHARMACY W/ HCPCS (ALT 636 FOR OP/ED)

## 2025-07-31 RX ORDER — POTASSIUM CHLORIDE 20 MEQ/1
40 TABLET, EXTENDED RELEASE ORAL ONCE
Status: COMPLETED | OUTPATIENT
Start: 2025-07-31 | End: 2025-07-31

## 2025-07-31 RX ORDER — CEFTRIAXONE 1 G/50ML
1 INJECTION, SOLUTION INTRAVENOUS EVERY 24 HOURS
Status: DISCONTINUED | OUTPATIENT
Start: 2025-07-31 | End: 2025-07-31 | Stop reason: HOSPADM

## 2025-07-31 RX ORDER — NAPROXEN SODIUM 220 MG/1
81 TABLET, FILM COATED ORAL DAILY
Status: DISCONTINUED | OUTPATIENT
Start: 2025-07-31 | End: 2025-07-31

## 2025-07-31 RX ORDER — LEVOTHYROXINE SODIUM 75 UG/1
75 TABLET ORAL DAILY
Status: DISCONTINUED | OUTPATIENT
Start: 2025-07-31 | End: 2025-07-31

## 2025-07-31 RX ORDER — ATORVASTATIN CALCIUM 40 MG/1
40 TABLET, FILM COATED ORAL NIGHTLY
Status: DISCONTINUED | OUTPATIENT
Start: 2025-07-31 | End: 2025-07-31 | Stop reason: HOSPADM

## 2025-07-31 RX ORDER — CLOPIDOGREL BISULFATE 75 MG/1
75 TABLET ORAL DAILY
Status: DISCONTINUED | OUTPATIENT
Start: 2025-07-31 | End: 2025-07-31 | Stop reason: HOSPADM

## 2025-07-31 RX ORDER — CEPHALEXIN 500 MG/1
500 CAPSULE ORAL 2 TIMES DAILY
Qty: 6 CAPSULE | Refills: 0 | Status: SHIPPED | OUTPATIENT
Start: 2025-07-31 | End: 2025-08-03

## 2025-07-31 RX ORDER — GADOTERATE MEGLUMINE 376.9 MG/ML
20 INJECTION INTRAVENOUS
Status: COMPLETED | OUTPATIENT
Start: 2025-07-31 | End: 2025-07-31

## 2025-07-31 RX ORDER — FLUOXETINE 20 MG/1
20 CAPSULE ORAL DAILY
Status: DISCONTINUED | OUTPATIENT
Start: 2025-07-31 | End: 2025-07-31 | Stop reason: HOSPADM

## 2025-07-31 RX ORDER — ATORVASTATIN CALCIUM 40 MG/1
40 TABLET, FILM COATED ORAL NIGHTLY
Qty: 30 TABLET | Refills: 0 | Status: SHIPPED | OUTPATIENT
Start: 2025-07-31 | End: 2025-08-30

## 2025-07-31 RX ORDER — LABETALOL HYDROCHLORIDE 5 MG/ML
10 INJECTION, SOLUTION INTRAVENOUS EVERY 10 MIN PRN
Status: DISCONTINUED | OUTPATIENT
Start: 2025-07-31 | End: 2025-07-31 | Stop reason: HOSPADM

## 2025-07-31 RX ORDER — ASPIRIN 81 MG/1
81 TABLET ORAL DAILY
Qty: 21 TABLET | Refills: 0 | Status: SHIPPED | OUTPATIENT
Start: 2025-08-01 | End: 2025-08-22

## 2025-07-31 RX ORDER — ASPIRIN 81 MG/1
81 TABLET ORAL DAILY
Status: DISCONTINUED | OUTPATIENT
Start: 2025-07-31 | End: 2025-07-31 | Stop reason: HOSPADM

## 2025-07-31 RX ORDER — DIAZEPAM 5 MG/ML
5 INJECTION, SOLUTION INTRAMUSCULAR; INTRAVENOUS ONCE AS NEEDED
Status: COMPLETED | OUTPATIENT
Start: 2025-07-31 | End: 2025-07-31

## 2025-07-31 RX ORDER — ATENOLOL AND CHLORTHALIDONE TABLET 50; 25 MG/1; MG/1
1 TABLET ORAL DAILY
Qty: 30 TABLET | Refills: 0 | Status: SHIPPED | OUTPATIENT
Start: 2025-08-01

## 2025-07-31 RX ORDER — CLOPIDOGREL BISULFATE 75 MG/1
75 TABLET ORAL DAILY
Qty: 30 TABLET | Refills: 0 | Status: SHIPPED | OUTPATIENT
Start: 2025-07-31 | End: 2025-08-30

## 2025-07-31 RX ORDER — DIAZEPAM 5 MG/ML
5 INJECTION, SOLUTION INTRAMUSCULAR; INTRAVENOUS ONCE AS NEEDED
Status: DISCONTINUED | OUTPATIENT
Start: 2025-07-31 | End: 2025-07-31 | Stop reason: HOSPADM

## 2025-07-31 RX ORDER — ENOXAPARIN SODIUM 100 MG/ML
40 INJECTION SUBCUTANEOUS EVERY 24 HOURS
Status: DISCONTINUED | OUTPATIENT
Start: 2025-07-31 | End: 2025-07-31 | Stop reason: HOSPADM

## 2025-07-31 RX ORDER — ASPIRIN 300 MG/1
300 SUPPOSITORY RECTAL DAILY
Status: DISCONTINUED | OUTPATIENT
Start: 2025-07-31 | End: 2025-07-31

## 2025-07-31 RX ORDER — POLYETHYLENE GLYCOL 3350 17 G/17G
17 POWDER, FOR SOLUTION ORAL DAILY
Status: DISCONTINUED | OUTPATIENT
Start: 2025-07-31 | End: 2025-07-31 | Stop reason: HOSPADM

## 2025-07-31 RX ORDER — LEVOTHYROXINE SODIUM 75 UG/1
75 TABLET ORAL DAILY
Status: DISCONTINUED | OUTPATIENT
Start: 2025-07-31 | End: 2025-07-31 | Stop reason: HOSPADM

## 2025-07-31 RX ORDER — MAGNESIUM SULFATE HEPTAHYDRATE 40 MG/ML
2 INJECTION, SOLUTION INTRAVENOUS ONCE
Status: COMPLETED | OUTPATIENT
Start: 2025-07-31 | End: 2025-07-31

## 2025-07-31 RX ORDER — NAPROXEN SODIUM 220 MG/1
324 TABLET, FILM COATED ORAL ONCE
Status: COMPLETED | OUTPATIENT
Start: 2025-07-31 | End: 2025-07-31

## 2025-07-31 RX ORDER — TRAZODONE HYDROCHLORIDE 50 MG/1
50 TABLET ORAL NIGHTLY
Status: DISCONTINUED | OUTPATIENT
Start: 2025-07-31 | End: 2025-07-31 | Stop reason: HOSPADM

## 2025-07-31 RX ADMIN — GADOTERATE MEGLUMINE 20 ML: 376.9 INJECTION INTRAVENOUS at 08:43

## 2025-07-31 RX ADMIN — DIAZEPAM 5 MG: 10 INJECTION, SOLUTION INTRAMUSCULAR; INTRAVENOUS at 07:36

## 2025-07-31 RX ADMIN — POTASSIUM CHLORIDE EXTENDED-RELEASE 40 MEQ: 1500 TABLET ORAL at 01:14

## 2025-07-31 RX ADMIN — FLUOXETINE HYDROCHLORIDE 20 MG: 20 CAPSULE ORAL at 09:13

## 2025-07-31 RX ADMIN — POTASSIUM CHLORIDE EXTENDED-RELEASE 40 MEQ: 1500 TABLET ORAL at 10:20

## 2025-07-31 RX ADMIN — ASPIRIN 81 MG: 81 TABLET, COATED ORAL at 09:13

## 2025-07-31 RX ADMIN — CEFTRIAXONE 1 G: 1 INJECTION, SOLUTION INTRAVENOUS at 10:21

## 2025-07-31 RX ADMIN — ASPIRIN 81 MG CHEWABLE TABLET 324 MG: 81 TABLET CHEWABLE at 01:13

## 2025-07-31 RX ADMIN — ATORVASTATIN CALCIUM 40 MG: 40 TABLET, FILM COATED ORAL at 02:08

## 2025-07-31 RX ADMIN — LEVOTHYROXINE SODIUM 75 MCG: 75 TABLET ORAL at 09:13

## 2025-07-31 RX ADMIN — MAGNESIUM SULFATE HEPTAHYDRATE 2 G: 40 INJECTION, SOLUTION INTRAVENOUS at 14:18

## 2025-07-31 RX ADMIN — ENOXAPARIN SODIUM 40 MG: 100 INJECTION SUBCUTANEOUS at 02:08

## 2025-07-31 SDOH — SOCIAL STABILITY: SOCIAL INSECURITY
WITHIN THE LAST YEAR, HAVE YOU BEEN KICKED, HIT, SLAPPED, OR OTHERWISE PHYSICALLY HURT BY YOUR PARTNER OR EX-PARTNER?: NO

## 2025-07-31 SDOH — SOCIAL STABILITY: SOCIAL INSECURITY: WITHIN THE LAST YEAR, HAVE YOU BEEN AFRAID OF YOUR PARTNER OR EX-PARTNER?: NO

## 2025-07-31 SDOH — HEALTH STABILITY: MENTAL HEALTH
DO YOU FEEL STRESS - TENSE, RESTLESS, NERVOUS, OR ANXIOUS, OR UNABLE TO SLEEP AT NIGHT BECAUSE YOUR MIND IS TROUBLED ALL THE TIME - THESE DAYS?: NOT AT ALL

## 2025-07-31 SDOH — ECONOMIC STABILITY: HOUSING INSECURITY: AT ANY TIME IN THE PAST 12 MONTHS, WERE YOU HOMELESS OR LIVING IN A SHELTER (INCLUDING NOW)?: NO

## 2025-07-31 SDOH — SOCIAL STABILITY: SOCIAL NETWORK
DO YOU BELONG TO ANY CLUBS OR ORGANIZATIONS SUCH AS CHURCH GROUPS, UNIONS, FRATERNAL OR ATHLETIC GROUPS, OR SCHOOL GROUPS?: NO

## 2025-07-31 SDOH — ECONOMIC STABILITY: HOUSING INSECURITY: IN THE LAST 12 MONTHS, WAS THERE A TIME WHEN YOU WERE NOT ABLE TO PAY THE MORTGAGE OR RENT ON TIME?: NO

## 2025-07-31 SDOH — SOCIAL STABILITY: SOCIAL INSECURITY: WERE YOU ABLE TO COMPLETE ALL THE BEHAVIORAL HEALTH SCREENINGS?: YES

## 2025-07-31 SDOH — SOCIAL STABILITY: SOCIAL INSECURITY: WITHIN THE LAST YEAR, HAVE YOU BEEN HUMILIATED OR EMOTIONALLY ABUSED IN OTHER WAYS BY YOUR PARTNER OR EX-PARTNER?: NO

## 2025-07-31 SDOH — ECONOMIC STABILITY: FOOD INSECURITY: WITHIN THE PAST 12 MONTHS, YOU WORRIED THAT YOUR FOOD WOULD RUN OUT BEFORE YOU GOT THE MONEY TO BUY MORE.: NEVER TRUE

## 2025-07-31 SDOH — SOCIAL STABILITY: SOCIAL NETWORK: HOW OFTEN DO YOU ATTEND MEETINGS OF THE CLUBS OR ORGANIZATIONS YOU BELONG TO?: NEVER

## 2025-07-31 SDOH — ECONOMIC STABILITY: INCOME INSECURITY: IN THE PAST 12 MONTHS HAS THE ELECTRIC, GAS, OIL, OR WATER COMPANY THREATENED TO SHUT OFF SERVICES IN YOUR HOME?: NO

## 2025-07-31 SDOH — ECONOMIC STABILITY: FOOD INSECURITY: WITHIN THE PAST 12 MONTHS, THE FOOD YOU BOUGHT JUST DIDN'T LAST AND YOU DIDN'T HAVE MONEY TO GET MORE.: NEVER TRUE

## 2025-07-31 SDOH — SOCIAL STABILITY: SOCIAL INSECURITY: ARE YOU MARRIED, WIDOWED, DIVORCED, SEPARATED, NEVER MARRIED, OR LIVING WITH A PARTNER?: WIDOWED

## 2025-07-31 SDOH — ECONOMIC STABILITY: HOUSING INSECURITY: IN THE PAST 12 MONTHS, HOW MANY TIMES HAVE YOU MOVED WHERE YOU WERE LIVING?: 0

## 2025-07-31 SDOH — SOCIAL STABILITY: SOCIAL INSECURITY
WITHIN THE LAST YEAR, HAVE YOU BEEN RAPED OR FORCED TO HAVE ANY KIND OF SEXUAL ACTIVITY BY YOUR PARTNER OR EX-PARTNER?: NO

## 2025-07-31 SDOH — SOCIAL STABILITY: SOCIAL INSECURITY: DOES ANYONE TRY TO KEEP YOU FROM HAVING/CONTACTING OTHER FRIENDS OR DOING THINGS OUTSIDE YOUR HOME?: NO

## 2025-07-31 SDOH — SOCIAL STABILITY: SOCIAL NETWORK: HOW OFTEN DO YOU GET TOGETHER WITH FRIENDS OR RELATIVES?: ONCE A WEEK

## 2025-07-31 SDOH — SOCIAL STABILITY: SOCIAL NETWORK: HOW OFTEN DO YOU ATTEND CHURCH OR RELIGIOUS SERVICES?: NEVER

## 2025-07-31 SDOH — HEALTH STABILITY: PHYSICAL HEALTH: ON AVERAGE, HOW MANY DAYS PER WEEK DO YOU ENGAGE IN MODERATE TO STRENUOUS EXERCISE (LIKE A BRISK WALK)?: 3 DAYS

## 2025-07-31 SDOH — SOCIAL STABILITY: SOCIAL NETWORK
IN A TYPICAL WEEK, HOW MANY TIMES DO YOU TALK ON THE PHONE WITH FAMILY, FRIENDS, OR NEIGHBORS?: MORE THAN THREE TIMES A WEEK

## 2025-07-31 SDOH — HEALTH STABILITY: PHYSICAL HEALTH
HOW OFTEN DO YOU NEED TO HAVE SOMEONE HELP YOU WHEN YOU READ INSTRUCTIONS, PAMPHLETS, OR OTHER WRITTEN MATERIAL FROM YOUR DOCTOR OR PHARMACY?: NEVER

## 2025-07-31 SDOH — HEALTH STABILITY: PHYSICAL HEALTH: ON AVERAGE, HOW MANY MINUTES DO YOU ENGAGE IN EXERCISE AT THIS LEVEL?: 60 MIN

## 2025-07-31 SDOH — SOCIAL STABILITY: SOCIAL INSECURITY: HAVE YOU HAD THOUGHTS OF HARMING ANYONE ELSE?: NO

## 2025-07-31 SDOH — SOCIAL STABILITY: SOCIAL INSECURITY: DO YOU FEEL ANYONE HAS EXPLOITED OR TAKEN ADVANTAGE OF YOU FINANCIALLY OR OF YOUR PERSONAL PROPERTY?: NO

## 2025-07-31 SDOH — ECONOMIC STABILITY: FOOD INSECURITY: HOW HARD IS IT FOR YOU TO PAY FOR THE VERY BASICS LIKE FOOD, HOUSING, MEDICAL CARE, AND HEATING?: NOT HARD AT ALL

## 2025-07-31 SDOH — SOCIAL STABILITY: SOCIAL INSECURITY: DO YOU FEEL UNSAFE GOING BACK TO THE PLACE WHERE YOU ARE LIVING?: NO

## 2025-07-31 SDOH — SOCIAL STABILITY: SOCIAL INSECURITY: ABUSE: ADULT

## 2025-07-31 SDOH — SOCIAL STABILITY: SOCIAL INSECURITY: ARE YOU OR HAVE YOU BEEN THREATENED OR ABUSED PHYSICALLY, EMOTIONALLY, OR SEXUALLY BY ANYONE?: NO

## 2025-07-31 SDOH — SOCIAL STABILITY: SOCIAL INSECURITY: ARE THERE ANY APPARENT SIGNS OF INJURIES/BEHAVIORS THAT COULD BE RELATED TO ABUSE/NEGLECT?: NO

## 2025-07-31 SDOH — SOCIAL STABILITY: SOCIAL INSECURITY: HAVE YOU HAD ANY THOUGHTS OF HARMING ANYONE ELSE?: NO

## 2025-07-31 SDOH — ECONOMIC STABILITY: TRANSPORTATION INSECURITY: IN THE PAST 12 MONTHS, HAS LACK OF TRANSPORTATION KEPT YOU FROM MEDICAL APPOINTMENTS OR FROM GETTING MEDICATIONS?: NO

## 2025-07-31 SDOH — SOCIAL STABILITY: SOCIAL INSECURITY: HAS ANYONE EVER THREATENED TO HURT YOUR FAMILY OR YOUR PETS?: NO

## 2025-07-31 ASSESSMENT — LIFESTYLE VARIABLES
HOW OFTEN DO YOU HAVE A DRINK CONTAINING ALCOHOL: NEVER
HOW MANY STANDARD DRINKS CONTAINING ALCOHOL DO YOU HAVE ON A TYPICAL DAY: PATIENT DOES NOT DRINK
HOW OFTEN DO YOU HAVE 6 OR MORE DRINKS ON ONE OCCASION: NEVER
SKIP TO QUESTIONS 9-10: 1
SUBSTANCE_ABUSE_PAST_12_MONTHS: NO
AUDIT-C TOTAL SCORE: 0
AUDIT-C TOTAL SCORE: 0
PRESCIPTION_ABUSE_PAST_12_MONTHS: NO

## 2025-07-31 ASSESSMENT — ENCOUNTER SYMPTOMS
ABDOMINAL PAIN: 0
CHEST TIGHTNESS: 0
WEAKNESS: 0
VOMITING: 0
CONSTIPATION: 0
PALPITATIONS: 0
DYSURIA: 0
COUGH: 0
NAUSEA: 0
DIZZINESS: 0
CHILLS: 0
DIFFICULTY URINATING: 0
SHORTNESS OF BREATH: 0
NUMBNESS: 0
DIARRHEA: 0
LIGHT-HEADEDNESS: 0
FEVER: 0
FATIGUE: 0
APPETITE CHANGE: 0
UNEXPECTED WEIGHT CHANGE: 0
SORE THROAT: 0
SPEECH DIFFICULTY: 1
HEADACHES: 0

## 2025-07-31 ASSESSMENT — PAIN SCALES - GENERAL
PAINLEVEL_OUTOF10: 0 - NO PAIN
PAINLEVEL_OUTOF10: 0 - NO PAIN

## 2025-07-31 ASSESSMENT — COGNITIVE AND FUNCTIONAL STATUS - GENERAL
CLIMB 3 TO 5 STEPS WITH RAILING: A LITTLE
PATIENT BASELINE BEDBOUND: NO
MOBILITY SCORE: 24
MOBILITY SCORE: 23
DAILY ACTIVITIY SCORE: 24
MOBILITY SCORE: 24
DAILY ACTIVITIY SCORE: 24

## 2025-07-31 ASSESSMENT — ACTIVITIES OF DAILY LIVING (ADL)
DRESSING YOURSELF: INDEPENDENT
WALKS IN HOME: INDEPENDENT
JUDGMENT_ADEQUATE_SAFELY_COMPLETE_DAILY_ACTIVITIES: YES
HEARING - LEFT EAR: HEARING AID
LACK_OF_TRANSPORTATION: NO
LACK_OF_TRANSPORTATION: NO
TOILETING: INDEPENDENT
BATHING: INDEPENDENT
LACK_OF_TRANSPORTATION: NO
PATIENT'S MEMORY ADEQUATE TO SAFELY COMPLETE DAILY ACTIVITIES?: YES
ADEQUATE_TO_COMPLETE_ADL: YES
FEEDING YOURSELF: INDEPENDENT
GROOMING: INDEPENDENT
HEARING - RIGHT EAR: HEARING AID

## 2025-07-31 ASSESSMENT — PAIN - FUNCTIONAL ASSESSMENT
PAIN_FUNCTIONAL_ASSESSMENT: 0-10
PAIN_FUNCTIONAL_ASSESSMENT: 0-10

## 2025-07-31 ASSESSMENT — PATIENT HEALTH QUESTIONNAIRE - PHQ9
SUM OF ALL RESPONSES TO PHQ9 QUESTIONS 1 & 2: 0
1. LITTLE INTEREST OR PLEASURE IN DOING THINGS: NOT AT ALL
2. FEELING DOWN, DEPRESSED OR HOPELESS: NOT AT ALL

## 2025-07-31 NOTE — CARE PLAN
The patient's goals for the shift include rest    The clinical goals for the shift include remain free of falls and injury    Problem: General Stroke  Goal: Establish a mutual long term goal with patient by discharge  Outcome: Progressing  Goal: Demonstrate improvement in neurological exam throughout the shift  Outcome: Progressing  Goal: Maintain BP within ordered limits throughout shift  Outcome: Progressing  Goal: Participate in treatment (ie., meds, therapy) throughout shift  Outcome: Progressing  Goal: No symptoms of aspiration throughout shift  Outcome: Progressing  Goal: No symptoms of hemorrhage throughout shift  Outcome: Progressing  Goal: Tolerate enteral feeding throughout shift  Outcome: Progressing  Goal: Decreased nausea/vomiting throughout shift  Outcome: Progressing  Goal: Controlled blood glucose throughout shift  Outcome: Progressing  Goal: Out of bed three times today  Outcome: Progressing

## 2025-07-31 NOTE — PROGRESS NOTES
Physical Therapy    Physical Therapy Evaluation    Patient Name: Fadia Akbar  MRN: [id number]  Today's Date: 7/31/2025   Time Calculation  Start Time: 1024  Stop Time: 1035  Time Calculation (min): 11 min  3111/3111-A    Assessment/Plan   PT Assessment: Pt appears at baseline level of function.  Pt able to ambulate around room IND.  Pt denies any concerns with returning home upon DC from the hospital.  Will DC PT orders at this time.    Barriers to Discharge Home: No anticipated barriers  Evaluation/Treatment Tolerance: Patient tolerated treatment well  Medical Staff Made Aware: Yes  End of Session Communication: Bedside nurse  End of Session Patient Position: Alarm off, not on at start of session (sitting EOB)  IP OR SWING BED PT PLAN  Inpatient or Swing Bed: Inpatient  PT Plan  PT Plan: PT Eval only  PT Eval Only Reason: Safe to return home  PT Frequency: PT eval only  PT Discharge Recommendations: No PT needed after discharge, No further acute PT  PT Recommended Transfer Status: Independent  PT - OK to Discharge: Yes - To next level of care when cleared by medical team    Subjective     Current Problem:  1. Slurred speech  Transthoracic Echo (TTE) Limited    Transthoracic Echo (TTE) Limited    Peds Zio Patch XT Long-Term Continuous Ambulatory Cardiac Monitor    Peds Zio Patch XT Long-Term Continuous Ambulatory Cardiac Monitor      2. Hypokalemia        3. Abnormal electrocardiogram (ECG) (EKG)  Transthoracic Echo (TTE) Limited    Transthoracic Echo (TTE) Limited          Past Medical History:  Problem List[1]    General Visit Information:  Per EMR: Pt came to the emergency room today at the urging of her grandson due to slurred speech. She reports that her symptoms started on Monday, 7/28. She does not feel like they got worse, but today her grandson did come over and was quite concerned about her speech pattern, and thus urged her to come to the emergency room. She reports nothing like this is ever happened  before. She denies any other neurological symptoms. She does not have any weakness or abnormal sensation. She does not feel as if she has any facial asymmetry and she has not had any confusion. She reports that overall she is very healthy and active. She denies any recent illnesses or recent medication changes.     On arrival, pt sitting EOB.  Pt in no apparent distress and agreeable to therapy.    General  Reason for Referral: stroke r/o  Referred By: Fawad Lyons  Prior to Session Communication: Bedside nurse  Patient Position Received: Alarm off, not on at start of session (sitting EOB)  General Comment: R sided facial droop but per EMR is chronic; mildly slurred speech    Home Living/PLOF:  Pt lives alone in ranch house with 1 CHRIS.  Has tub shower without DME.  IND with ADLs and mobility.  Pt drives and shops.  Denies any falls.     Precautions:  Precautions  Medical Precautions: Fall precautions     Objective     Pain:  Pain Assessment  Pain Assessment: 0-10  0-10 (Numeric) Pain Score: 0 - No pain    Cognition:  Cognition  Overall Cognitive Status: Within Functional Limits  Orientation Level: Oriented X4    General Assessments:      Activity Tolerance  Endurance: Endurance does not limit participation in activity  Sensation  Sensation Comment: denies any numbness/tingling    Static Sitting Balance  Static Sitting-Comment/Number of Minutes: good  Dynamic Sitting Balance  Dynamic Sitting-Comments: good  Static Standing Balance  Static Standing-Comment/Number of Minutes: good  Dynamic Standing Balance  Dynamic Standing-Comments: good    Extremity/Trunk Assessments:  BLE strength: grossly 5/5    Functional Mobility:  Bed mobility  NT 2/2 sitting EOB on arrival     Transfers  Sit to stand: SUP  Stand to sit: SUP    Ambulation/Stairs  Pt ambulated 25 ft with IND; grossly steady without LOB    Outcome Measures:  [facility] Basic Mobility  Turning from your back to your side while in a flat bed without using bedrails:  None  Moving from lying on your back to sitting on the side of a flat bed without using bedrails: None  Moving to and from bed to chair (including a wheelchair): None  Standing up from a chair using your arms (e.g. wheelchair or bedside chair): None  To walk in hospital room: None  Climbing 3-5 steps with railing: None  Basic Mobility - Total Score: 24    Education Documentation  Mobility Training, taught by Cecilia Robertson PT at 7/31/2025  2:29 PM.  Learner: Patient  Readiness: Acceptance  Method: Explanation  Response: Verbalizes Understanding    Education Comments  No comments found.             [1]   Patient Active Problem List  Diagnosis    Benign essential HTN    Hypothyroidism    Abnormal LFTs    Abnormal mammogram    Primary osteoarthritis of left knee    Stage 3a chronic kidney disease (Multi)    Malignant neoplasm of uterus, unspecified site (Multi)    Stroke-like symptoms

## 2025-07-31 NOTE — H&P
Internal Medicine    Admission H&P      Patient Fadia Akbar PCP Temitope Sue DO    MRN [id number]  Admission Date 7/30/2025      Chief Complaint/Reason for Admission:  Fadia is a 80 y.o. female who presented today with slurred speech    Subjective    Subjective   History of Presenting Illness  Ms. Fadia Akbar is a 80 y.o. female with a primary complaint of slurred speech. PMHx significant for HTN, CKD, hypothyroidism, T2DM.  She came to the emergency room today at the urging of her grandson due to slurred speech.  She reports that her symptoms started on Monday, 7/28.  She does not feel like they got worse, but today her grandson did come over and was quite concerned about her speech pattern, and thus urged her to come to the emergency room.  She reports nothing like this is ever happened before.  She denies any other neurological symptoms.  She does not have any weakness or abnormal sensation.  She does not feel as if she has any facial asymmetry and she has not had any confusion.  She reports that overall she is very healthy and active.  She denies any recent illnesses or recent medication changes.    ED course:  V/S: On presentation to the emergency room, temperature 98.2 °F, HR 54, RR 16, /88, SpO2 97% on room air  Labs: CBC did not show any leukocytosis or anemia.  CMP showed hypokalemia at 3, BUN/creatinine 12/1.29, which appears to be near her baseline.  Troponin negative at 8.  UA was collected which showed turbid urine with 1+ blood 2+ nitrates and 500 leukocyte esterase.  EKG: EKG showed sinus bradycardia with a VR of 52.  VA interval 180, QTc 431.  No ST segment elevations or depressions were noted, no signs of ischemic changes.  Imaging: CXR showed no acute cardiopulmonary process.  CT head showed no acute intracranial abnormality.  Intervention: Given 325 mg aspirin as well as 40 mill equivalents potassium and admitted to medicine service.    Past Medical History  Active Ambulatory  Problems     Diagnosis Date Noted    Benign essential HTN 09/13/2023    Hypothyroidism 09/13/2023    Abnormal LFTs 09/13/2023    Abnormal mammogram 09/13/2023    Primary osteoarthritis of left knee 09/13/2023    Stage 3a chronic kidney disease (Multi) 09/13/2023    Malignant neoplasm of uterus, unspecified site (Multi) 09/13/2023     Resolved Ambulatory Problems     Diagnosis Date Noted    No Resolved Ambulatory Problems     No Additional Past Medical History       Past Surgical History     Social History    Home Medication:  Prior to Admission medications   Medication Sig Start Date End Date Taking? Authorizing Provider   atenoloL-chlorthalidone (Tenoretic) 50-25 mg tablet Take 1 tablet by mouth once daily. 12/16/24   Temitope Sue DO   atenoloL-chlorthalidone (Tenoretic) 50-25 mg tablet TAKE ONE TABLET BY MOUTH once DAILY 7/24/25   Temitope Sue DO   FLUoxetine (PROzac) 20 mg capsule Take 1 capsule (20 mg) by mouth once daily. 7/24/25   Temitope Sue, DO   levothyroxine (Synthroid, Levoxyl) 75 mcg tablet Take 1 tablet (75 mcg) by mouth once daily. 7/24/25   Temitope Sue, DO   potassium chloride CR 20 mEq ER tablet Take 1 tablet (20 mEq) by mouth once daily. do not crush chew or split 3/12/25   Temitope Sue DO   traZODone (Desyrel) 50 mg tablet Take 1 tablet (50 mg) by mouth once daily at bedtime. 7/24/25   Temitope Sue, DO   triamcinolone (Kenalog) 0.1 % cream Apply topically 2 times a day as needed for rash. 4/10/24   TANMAY Barajas-CNP        Family History  Noncontributory to current complaint.    Allergies:  RX Allergies[1]     Review of System:  Review of Systems   Constitutional:  Negative for appetite change, chills, fatigue, fever and unexpected weight change.   HENT:  Negative for congestion and sore throat.    Eyes:  Negative for visual disturbance.   Respiratory:  Negative for cough, chest tightness and shortness of breath.    Cardiovascular:  Negative for chest pain,  palpitations and leg swelling.   Gastrointestinal:  Negative for abdominal pain, constipation, diarrhea, nausea and vomiting.   Genitourinary:  Negative for difficulty urinating and dysuria.   Neurological:  Positive for speech difficulty. Negative for dizziness, syncope, weakness, light-headedness, numbness and headaches.       Objective    Objective   Vital Signs:  Visit Vitals  /82 (BP Location: Right arm, Patient Position: Sitting)   Pulse 52   Temp 36.3 °C (97.3 °F) (Temporal)   Resp 18   Ht 1.829 m (6')   Wt 97.5 kg (215 lb)   SpO2 96%   BMI 29.16 kg/m²   OB Status Postmenopausal   Smoking Status Never   BSA 2.23 m²        Physical Examination:    General: Not in acute distress, A&O x3, alert, coopertive, well-developed; patient does have noticeably slurred speech throughout interview  HEENT: Normocephalic, atraumatic, EOMI, moist mucous membranes  Neck: Neck supple, trachea midline, no evidence of trauma  Cardiovascular: RRR, S1 and S2 appreciated, no murmurs rubs gallops appreciated, distal pulses 2+ bilaterally (radial and dorsalis pedis)  Respiratory: Vesicular breath sounds appreciated bilaterally, no adventitious sounds appreciated, no increased work of breathing  GI: Abdomen soft, nondistended, nontender to palpation, bowel sounds present  Extremities: No edema appreciated in lower extremities bilaterally, no cyanosis  Neuro: A&O x3, no focal deficits but slurred speech was noted, strength and sensation intact bilaterally  Skin: Warm and dry, without lesions or rashes  Psychiatric: Judgment intact.  Appropriate mood, affect and behavior.      Laboratory Data:  Results for orders placed or performed during the hospital encounter of 07/30/25 (from the past 24 hours)   Light Blue Top   Result Value Ref Range    Extra Tube Hold for add-ons.    Lavender Top   Result Value Ref Range    Extra Tube Hold for add-ons.    CBC and Auto Differential   Result Value Ref Range    WBC 10.2 4.4 - 11.3 x10*3/uL     nRBC 0.0 0.0 - 0.0 /100 WBCs    RBC 5.36 (H) 4.00 - 5.20 x10*6/uL    Hemoglobin 15.5 12.0 - 16.0 g/dL    Hematocrit 45.5 36.0 - 46.0 %    MCV 85 80 - 100 fL    MCH 28.9 26.0 - 34.0 pg    MCHC 34.1 32.0 - 36.0 g/dL    RDW 13.2 11.5 - 14.5 %    Platelets 280 150 - 450 x10*3/uL    Neutrophils % 63.7 40.0 - 80.0 %    Immature Granulocytes %, Automated 0.1 0.0 - 0.9 %    Lymphocytes % 25.5 13.0 - 44.0 %    Monocytes % 8.0 2.0 - 10.0 %    Eosinophils % 2.2 0.0 - 6.0 %    Basophils % 0.5 0.0 - 2.0 %    Neutrophils Absolute 6.52 (H) 1.60 - 5.50 x10*3/uL    Immature Granulocytes Absolute, Automated 0.01 0.00 - 0.50 x10*3/uL    Lymphocytes Absolute 2.61 0.80 - 3.00 x10*3/uL    Monocytes Absolute 0.82 (H) 0.05 - 0.80 x10*3/uL    Eosinophils Absolute 0.23 0.00 - 0.40 x10*3/uL    Basophils Absolute 0.05 0.00 - 0.10 x10*3/uL   Comprehensive metabolic panel   Result Value Ref Range    Glucose 103 (H) 74 - 99 mg/dL    Sodium 139 136 - 145 mmol/L    Potassium 3.0 (L) 3.5 - 5.3 mmol/L    Chloride 96 (L) 98 - 107 mmol/L    Bicarbonate 30 21 - 32 mmol/L    Anion Gap 16 10 - 20 mmol/L    Urea Nitrogen 12 6 - 23 mg/dL    Creatinine 1.29 (H) 0.50 - 1.05 mg/dL    eGFR 42 (L) >60 mL/min/1.73m*2    Calcium 9.7 8.6 - 10.3 mg/dL    Albumin 4.6 3.4 - 5.0 g/dL    Alkaline Phosphatase 78 33 - 136 U/L    Total Protein 8.2 6.4 - 8.2 g/dL    AST 20 9 - 39 U/L    Bilirubin, Total 0.7 0.0 - 1.2 mg/dL    ALT 16 7 - 45 U/L   Troponin I, High Sensitivity   Result Value Ref Range    Troponin I, High Sensitivity 8 0 - 13 ng/L   Urinalysis with Reflex Culture and Microscopic   Result Value Ref Range    Color, Urine Yellow Light-Yellow, Yellow, Dark-Yellow    Appearance, Urine Turbid (N) Clear    Specific Gravity, Urine 1.017 1.005 - 1.035    pH, Urine 6.0 5.0, 5.5, 6.0, 6.5, 7.0, 7.5, 8.0    Protein, Urine 10 (TRACE) NEGATIVE, 10 (TRACE), 20 (TRACE) mg/dL    Glucose, Urine Normal Normal mg/dL    Blood, Urine 0.06 (1+) (A) NEGATIVE mg/dL    Ketones, Urine  NEGATIVE NEGATIVE mg/dL    Bilirubin, Urine NEGATIVE NEGATIVE mg/dL    Urobilinogen, Urine Normal Normal mg/dL    Nitrite, Urine 2+ (A) NEGATIVE    Leukocyte Esterase, Urine 500 Jennifer/uL (A) NEGATIVE   Microscopic Only, Urine   Result Value Ref Range    WBC, Urine >50 (A) 1-5, NONE /HPF    WBC Clumps, Urine MANY Reference range not established. /HPF    RBC, Urine 11-20 (A) NONE, 1-2, 3-5 /HPF    Bacteria, Urine 1+ (A) NONE SEEN /HPF    Budding Yeast, Urine PRESENT (A) NONE /HPF    Mucus, Urine FEW Reference range not established. /LPF       Imaging:  Imaging  CT head wo IV contrast  Result Date: 7/30/2025  No acute intracranial abnormality.     MACRO: None.   Signed by: Rodney Price 7/30/2025 11:11 PM Dictation workstation:   [id number]    XR chest 2 views  Result Date: 7/30/2025  No acute cardiopulmonary process.   MACRO: None   Signed by: Rodney Price 7/30/2025 11:06 PM Dictation workstation:   [id number]      Cardiology, Vascular, and Other Imaging  No other imaging results found for the past 2 days       Medications:  Scheduled medications  Scheduled Medications[2]  Continuous medications  Continuous Medications[3]  PRN medications  PRN Medications[4]    Assessment    Assessment & Plan   Ms. Fadia Akbar is a 80 y.o. female who presents with slurred speech that began on Monday, 7/28.  PMHx significant for HTN, T2DM, CKD.  She reports never having similar symptoms.  Workup in the emergency room was negative for acute stroke, patient to be admitted for MRI of the brain.    Assessment & Plan  Stroke-like symptoms         # Dysarthria  # C/F TIA  # HTN  Patient presents with a 3-day history of dysarthria, which began when she woke up in the morning on 7/28.  She reports that her symptoms have been relatively stable since it started, nothing like this is ever happened before.  - CT head revealed no acute intracranial abnormality  - Patient was loaded with  mg in the ED. Will continue ASA 81mg daily.  -  Started on atorvastatin 84mg daily  - Neurology consulted, appreciate recommendations  - MRI brain pending, MRA head/neck pending  - Echo with bubble study pending  - Will check lipid panel and hemoglobin A1C as well as BNP  - Monitor on telemetry, q4 neuro checks  - Patient will be NPO pending bedisde swallow evaluation  - PT/OT eval pending, SLP eval pending      CHRONIC PROBLEMS:  # CKD  # T2DM  -Continue home medications as indicated once med rec has been completed    Global Plan of Care  IVF: None  Diet: NPO pending swallow eval, then regular diet  DVT prophylaxis: Lovenox  Dispo: Anticipate 1-2 midnight stays pending MRI, neuro evaluation  Consults: Neurology  Code Status: Full code      Patient seen and to be staffed with attending.  Signature: Meghan Leonard DO  Internal Medicine PGY-2 Resident  Date: July 31, 2025    I saw and evaluated the patient. I personally obtained the key and critical portions of the history and physical exam or was physically present for key and critical portions performed by the resident/fellow. I reviewed the resident/fellow's documentation and discussed the patient with the resident/fellow. I agree with the resident/fellow's medical decision making as documented in the note.     80 YOF presenting to [facility] ED with dysarthria x 3 days.  Began suddenly, and slowly worsened per patient. Family convinced her to seek medical attention. On exam NIH1 for mild-mod dysarthria  No VF deficits, symmetric smile, no aphasia, intact cerebellar movements, no pronator drift, normal & equal strength of BUE & BLE, follows all commands. CTH negative. Awaiting MRI/MRA. ASA loaded, started on high intensity statin. Neurology consulted.    Complexity moderate anticipate discharge next 24 hours pending MRI results and neurology recs    Jerome Bauer DO  Internal Medicine        [1]   Allergies  Allergen Reactions    Penicillins Swelling and Hives   [2] aspirin, 81 mg, oral, Daily   Or  aspirin, 81 mg,  oral, Daily   Or  aspirin, 81 mg, nasogastric tube, Daily   Or  aspirin, 300 mg, rectal, Daily  atorvastatin, 40 mg, oral, Nightly  enoxaparin, 40 mg, subcutaneous, q24h  polyethylene glycol, 17 g, oral, Daily  [3]    [4] PRN medications: labetaloL, oxygen

## 2025-07-31 NOTE — CONSULTS
Consults    History Of Present Illness  Fadia Akbar is a 80 y.o. female presenting with slurred speech.    She first noticed slurred speech on Monday. She did not think uch of this but her grandson came over on Wednesday and noticed the change in her speech and encouraged her to go to the hospital.    She denies any headache, vision changes, facial weakness, unilateral weakness, numbness or incoordination. She does have a history of facial asymmetry from a remote surgery to the right lower face to remove a benign tumor. She has no history of TIA or stroke.     She has a history of HTN on atenolol and she snores. She has no HLD, DM, CAD, and is a never smoker.     Last known well: Monday 7/28/2025  Had stroke symptoms resolved at time of presentation: No      Past Medical History  Medical History[1]  Surgical History  Surgical History[2]  Social History  Social History[3]  Allergies  Penicillins  Home Medications  Prescriptions Prior to Admission[4]    Review of Systems  Neurological Exam  Mental Status  Awake, alert and oriented to person, place and time. Mild dysarthria present.    Cranial Nerves  CN II: Visual fields full to confrontation.  CN III, IV, VI: Extraocular movements intact bilaterally.  CN V: Facial sensation is normal.  CN VII: Full and symmetric facial movement.  CN VIII: Hearing is normal.  CN IX, X: Palate elevates symmetrically  CN XI: Shoulder shrug strength is normal.  CN XII: Tongue midline without atrophy or fasciculations.  Facial asymmetry on the R with asymmetric smile but no facial droop .    Motor   Strength is 5/5 throughout all four extremities.    Sensory  Light touch is normal in upper and lower extremities.     Coordination  Right: Finger-to-nose normal. Heel-to-shin normal.Left: Finger-to-nose normal. Heel-to-shin normal.    Physical Exam    Eyes:      Extraocular Movements: Extraocular movements intact.       Neurological:      Cranial Nerves: Dysarthria present.      Motor:  Motor strength is normal.      Last Recorded Vitals  Blood pressure 122/80, pulse 54, temperature 36.4 °C (97.5 °F), resp. rate 18, height 1.829 m (6'), weight 97.5 kg (215 lb), SpO2 97%.     NIHSS:   NIH Stroke Scale:     1A. Level of Consciousness:  Alert (keenly responsive) (0)    1B. Ask Month and Age:  Both questions right (0)    1C. Blink Eyes & Squeeze Hands:  Performs both tasks (0)    2. Best Gaze:  Normal (0)    3. Visual:  No visual loss (0)    4. Facial Palsy:  Normal symmetry (0)    5A. Motor - Left Arm:  No drift (0)    5B. Motor - Right Arm:  No drift (0)    6A. Motor - Left Leg:  No drift (0)    6B. Motor - Right Leg:  No drift (0)    7. Limb Ataxia:  No ataxia (0)    8. Sensory Loss:  Normal (no sensory loss) (0)    9. Best Language:  Normal (no aphasia) (0)    10. Dysarthia:  Mild-moderate dysarthria (+1)    11. Extinction and Inattention:  No abnormality (0)    NIH Stroke Scale:  1          Relevant Results    NIH Stroke Scale  1A. Level of Consciousness: Alert, Keenly Responsive  1B. Ask Month and Age: Both Questions Right  1C. Blink Eyes & Squeeze Hands: Performs Both Tasks  2. Best Gaze: Normal  3. Visual: No Visual Loss  4. Facial Palsy: Minor Paralysis  5A. Motor - Left Arm: No Drift  5B. Motor - Right Arm: No Drift  6A. Motor - Left Leg: No Drift  6B. Motor - Right Leg: No Drift  7. Limb Ataxia: Absent  8. Sensory Loss: Normal  9. Best Language: No Aphasia  10. Dysarthria: Normal  11. Extinction and Inattention: No Abnormality  NIH Stroke Scale: 1           [place] Coma Scale  Best Eye Response: Spontaneous  Best Verbal Response: Oriented  Best Motor Response: Follows commands  Joe Coma Scale Score: 15                [unfilled]  CT head wo IV contrast  Result Date: 7/30/2025  Interpreted By:  Rodney Price, STUDY: CT HEAD WO IV CONTRAST;  7/30/2025 10:23 pm   INDICATION: Signs/Symptoms:Slurred speech.   COMPARISON: 11/11/2013   ACCESSION NUMBER(S): [id number]   ORDERING  "CLINICIAN: KATHI GOTTLIEB   TECHNIQUE: Noncontrast axial CT images of head were obtained with coronal and sagittal reconstructed images.   FINDINGS: BRAIN PARENCHYMA:  No acute intraparenchymal hemorrhage or parenchymal evidence of acute large territory ischemic infarct. No mass-effect. Gray-white matter distinction is preserved.   VENTRICLES and EXTRA-AXIAL SPACES:  No acute extra-axial or intraventricular hemorrhage. No effacement of cerebral sulci. Ventricles and sulci are age-concordant.   PARANASAL SINUSES/MASTOIDS:  Polyp or retention cysts seen left maxillary sinus. The mastoids are well aerated.   CALVARIUM/ORBITS:  No skull fracture. The orbits and globes are intact to the extent visualized.   EXTRACRANIAL SOFT TISSUES: No discernible abnormality.       No acute intracranial abnormality.     MACRO: None.   Signed by: Rodney Price 7/30/2025 11:11 PM Dictation workstation:   [id number]    No echocardiogram results found for the past 14 days        No results found for: \"BNP\"     I have personally reviewed the following imaging results:   Imaging  CT head wo IV contrast  Result Date: 7/30/2025  No acute intracranial abnormality.     MACRO: None.   Signed by: Rodney Price 7/30/2025 11:11 PM Dictation workstation:   [id number]    XR chest 2 views  Result Date: 7/30/2025  No acute cardiopulmonary process.   MACRO: None   Signed by: Rodney Price 7/30/2025 11:06 PM Dictation workstation:   [id number]      Cardiology, Vascular, and Other Imaging  No other imaging results found for the past 7 days        Stroke Alert CT/MRI review: Actual date and time 7/30/2025 23:11     IV Thrombolysis IV Thrombolysis Checklist      IV Thrombolysis Given: No; Thrombolysis contraindication reason: Time from Last Known Well (or stroke onset) is >4.5 hours        Assessment/Plan   Assessment & Plan  Stroke-like symptoms      Type: Ischemic stroke  Subtype/etiology: embolic  Vessels involved: L MCA  Neurological manifestations: " dysarthria  NIHSS (worst at presentation): 1   Diagnostic evaluation:   MRI brain without contrast  TTE  Lipid panel, hemoglobin A1C  Plan to discharge home with holter monitor  Referral to sleep medicine as an outpatient for evaluation of loud snoring and possible VICKI  Antiplatelet/antithrombotic plan for stroke prevention: DAPT with aspirin 81mg and clopidogrel 75mg daily x21 days then aspirin 81mg daily  VTE prophylaxis: enoxaparin  Vascular Risk Factor modification goals:  Blood pressure goals: avoid hypotension SBP <100 that could worsen cerebral perfusion, Ischemic stroke- early permissive hypertension SBP < 220 mmHg with cautious inpatient lowering  Lipid Goals: education on healthy diet and statin therapy to maintain or achieve goal LDL-cholesterol < 70mg.  Glucose Goals: early treatment of hyperglycemia to goal glucose 140-180 mg/dl with long-term goal A1c < 7%   Smoking Cessation and Education  Assessment for Rehabilitation needs   Patient and family education on signs and symptoms of stroke, calling 911, healthy strategies for stroke prevention.         Ok to discharge home when medically ready  Follow-up with Neurology within 4 weeks, can see KRUNAL Houston MD         [1] History reviewed. No pertinent past medical history.  [2]   Past Surgical History:  Procedure Laterality Date    OTHER SURGICAL HISTORY  06/15/2022    Cataract surgery   [3]   Social History  Tobacco Use    Smoking status: Never    Smokeless tobacco: Never   Vaping Use    Vaping status: Never Used   Substance Use Topics    Alcohol use: Never    Drug use: Never   [4]   Medications Prior to Admission   Medication Sig Dispense Refill Last Dose/Taking    atenoloL-chlorthalidone (Tenoretic) 50-25 mg tablet Take 1 tablet by mouth once daily. 30 tablet 0     atenoloL-chlorthalidone (Tenoretic) 50-25 mg tablet TAKE ONE TABLET BY MOUTH once DAILY 90 tablet 0     FLUoxetine (PROzac) 20 mg capsule Take 1 capsule (20 mg) by  mouth once daily. 90 capsule 2     levothyroxine (Synthroid, Levoxyl) 75 mcg tablet Take 1 tablet (75 mcg) by mouth once daily. 90 tablet 2     potassium chloride CR 20 mEq ER tablet Take 1 tablet (20 mEq) by mouth once daily. do not crush chew or split 30 tablet 0     traZODone (Desyrel) 50 mg tablet Take 1 tablet (50 mg) by mouth once daily at bedtime. 90 tablet 1     triamcinolone (Kenalog) 0.1 % cream Apply topically 2 times a day as needed for rash. 80 g 1

## 2025-07-31 NOTE — PROGRESS NOTES
Occupational Therapy                 Therapy Communication Note    Patient Name: Fadia Akbar  MRN: [id number]  Department: [facility] 3 S OBS  Room: 3111/3111-A  Today's Date: 7/31/2025     Discipline: Occupational Therapy    Comment: Received orders for OT eval 7/31. Completed chart review, and spoke with physical therapy. Pt has been up independent with ADL's and mobility, and does not have any home going needs/concerns. OT services will be discharge at this time.

## 2025-07-31 NOTE — ED PROVIDER NOTES
History of Present Illness     History provided by: Patient  Limitations to History: None  External Records Reviewed with Brief Summary: None    HPI:  Fadia Akbar is a 80 y.o. female with history of hypertension, CKD, hypothyroidism and T2DM who presents to the emergency department for concern of difficulty pronouncing words since Monday that worsened today. She reports having postnasal drip. She denies head trauma, recent falls, drooling, weakness to extremities, vision changes, numbness or tingling, chest pain, shortness of breath, abdominal pain, nausea or vomiting.    Physical Exam   Triage vitals:  T 36.8 °C (98.2 °F)  HR 54  /88  RR 16  O2 97 % None (Room air)    Physical Exam  Vitals and nursing note reviewed.   Constitutional:       Appearance: She is not ill-appearing or toxic-appearing.   HENT:      Head: Normocephalic and atraumatic.      Right Ear: Tympanic membrane, ear canal and external ear normal.      Left Ear: Tympanic membrane, ear canal and external ear normal.      Nose: Nose normal.      Mouth/Throat:      Mouth: Mucous membranes are moist.      Pharynx: Oropharynx is clear.     Eyes:      General: Lids are normal. Vision grossly intact. No visual field deficit.     Extraocular Movements: Extraocular movements intact.      Right eye: No nystagmus.      Left eye: No nystagmus.      Pupils: Pupils are equal, round, and reactive to light.       Cardiovascular:      Rate and Rhythm: Normal rate and regular rhythm.      Pulses: Normal pulses.   Pulmonary:      Effort: Pulmonary effort is normal.      Breath sounds: Normal breath sounds.   Abdominal:      General: Abdomen is flat.      Palpations: Abdomen is soft.      Tenderness: There is no abdominal tenderness.     Musculoskeletal:         General: Normal range of motion.      Cervical back: Normal range of motion.      Right lower leg: No edema.      Left lower leg: No edema.     Skin:     General: Skin is warm and dry.      Neurological:      General: No focal deficit present.      Mental Status: She is oriented to person, place, and time.      GCS: GCS eye subscore is 4. GCS verbal subscore is 5. GCS motor subscore is 6.      Cranial Nerves: Cranial nerves 2-12 are intact.      Sensory: Sensation is intact. No sensory deficit.      Motor: No weakness, tremor or pronator drift.      Coordination: Coordination normal. Heel to Shin Test normal.      Gait: Gait normal.     Psychiatric:         Behavior: Behavior normal.          Medical Decision Making & ED Course   Medical Decision Making:  Case discussed with Dr. Oh Sandoval    80 y.o. female who presents to the emergency department for evaluation of worsening slurred speech.  Upon examination, patient is alert and oriented to person, place and time.  No focal neurological deficits, does have mild slurred speech.  No facial asymmetry.  Smile equal and symmetrical.   No drooling.  Follows commands appropriately.  Strength and sensation intact.    Workup completed, showed EKG normal sinus bradycardia at 52 bpm, without no acute ischemic changes.  Labs significant for hypokalemia of 3.0 (repleted with 40 mEq of potassium), creatinine 1.29 and EGFR 42 (consistent with patient's baseline from 8 months ago.),  Troponin not elevated at 8.  Urinalysis pending results.  CTH and CXR without acute abnormalities.    Given the patient's symptoms, believe the patient would benefit from admission for additional testing for TIA workup including an MRI and echocardiogram.  Discussed case with admitting team, spoke with Dr. Bauer who accepted patient.    Differential diagnoses considered include but are not limited to: CVA, TIA, Rizzo's palsy.     Social Determinants of Health which Significantly Impact Care: None identified     EKG Independent Interpretation: EKG interpreted by myself. Please see ED Course for full interpretation.    Independent Result Review and Interpretation: Relevant laboratory  and radiographic results were reviewed and independently interpreted by myself.  As necessary, they are commented on in the ED Course.    Chronic conditions affecting the patient's care: As documented above in [facility]    The patient was discussed with the following consultants/services: Hospitalist/Admitting Provider who accepted the patient for admission    Care Considerations: As documented above in [facility]    ED Course:  ED Course as of 08/01/25 2011 Wed Jul 30, 2025 2149 EKG taken at 2144 on 7/30/2025 showing normal sinus bradycardia, normal axis, normal intervals, no acute ST elevation or depression, rate of 52 [DS]   2215 CBC and Auto Differential(!)  No significant finding [ED]   2215 Troponin I, High Sensitivity  Not elevated at 8 [ED]   2329 XR chest 2 views  IMPRESSION:  No acute cardiopulmonary process.   [ED]   2330 CT head wo IV contrast  IMPRESSION:  No acute intracranial abnormality.   [ED]   Thu Jul 31, 2025   0033 Comprehensive metabolic panel(!)  Hypokalemia of 3.0   Creatinine 1.29 downtrending from 8 months ago.   [ED]      ED Course User Index  [DS] Oh Sandoval MD  [ED] JAMES Silva         Diagnoses as of 08/01/25 2011   Slurred speech   Hypokalemia     Disposition   Admitted to Medicine  Procedures   Procedures    This was a shared visit with an ED attending.  The patient was seen and discussed with the ED attending Dr. Oh Sandoval.    JAMES Silva  Emergency Medicine     JAMES Silva  08/01/25 2038

## 2025-07-31 NOTE — PROGRESS NOTES
07/31/25 1053   Discharge Planning   Living Arrangements Alone   Support Systems Family members;Children   Assistance Needed none   Type of Residence Private residence   Number of Stairs to Enter Residence 1   Number of Stairs Within Residence 0   Home or Post Acute Services   (pending therapy)   Expected Discharge Disposition Home   Financial Resource Strain   How hard is it for you to pay for the very basics like food, housing, medical care, and heating? Not hard   Housing Stability   In the last 12 months, was there a time when you were not able to pay the mortgage or rent on time? N   At any time in the past 12 months, were you homeless or living in a shelter (including now)? N   Transportation Needs   In the past 12 months, has lack of transportation kept you from medical appointments or from getting medications? no   In the past 12 months, has lack of transportation kept you from meetings, work, or from getting things needed for daily living? No   Intensity of Service   Intensity of Service >30 min     Spoke to patient at bedside to explain my role in discharge planning. Patient states she lives at home alone and her family lives across the street. She is independent with her care and does not use any DME. States she just retired in May. PCP is Temitope Sue. She feels she effectively manages her health at home and plans to return home when medically ready. Therapy evals pending at this time. CT team to follow for recommendations.

## 2025-07-31 NOTE — PROGRESS NOTES
"Nutrition Diet Education  Reason for education: Stroke Work Up    Nutrition Note:  Fadia Akbar is a 80 y.o. female who presents with slurred speech that began on Monday, 7/28. She reports never having similar symptoms. Workup in the emergency room was negative for acute stroke, patient to be admitted for MRI of the brain.     PMHx significant for HTN, CKD, hypothyroidism, T2DM.   Nutrition Significant Labs:  A1C:  Lab Results   Component Value Date    HGBA1C 6.0 (H) 07/31/2025   , BG POCT trend:    , Lipid Panel:   Lab Results   Component Value Date    CHOL 151 07/31/2025    HDL 36.9 07/31/2025    CHHDL 4.1 07/31/2025    LDLF 92 09/13/2023    VLDL 18 07/31/2025    TRIG 89 07/31/2025        Current Diet: Adult diet Regular    Encounter summary: Met with pt at bedside.      Education Documentation  Nutrition Related Education, taught by Zandra Sesay RDN, LD at 7/31/2025  4:20 PM.  Learner: Patient  Readiness: Acceptance  Method: Explanation  Response: Verbalizes Understanding  Comment: Pt denies need for a handout. She explains that she was dx with diabetes in the past and participated in the diabetes education program, but found out she was dx'd \"by mistake\". She still has the handouts & does try to follow some of the guidelines.      Also reviewed lipid panel and the importance of limiting sodium and prioritizing heart healthy eating.        Nutrition Prescription/Recommended Diet:  Individualized Nutrition Prescription Provided for : Suggest CARDIAC diet.    Time Spent/Follow-up Reminder:   Time Spent (min): 30 minutes  Follow up: Provided inpatient RDN contact information  Last Date of Nutrition Visit: 07/31/25  Nutrition Follow-Up Needed?: 7-10 days  Follow up Comment: KB  "

## 2025-07-31 NOTE — CARE PLAN
The patient's goals for the shift include rest    The clinical goals for the shift include remain free of falls and injury      Problem: General Stroke  Goal: Establish a mutual long term goal with patient by discharge  Outcome: Progressing  Goal: Demonstrate improvement in neurological exam throughout the shift  Outcome: Progressing  Goal: Maintain BP within ordered limits throughout shift  Outcome: Progressing  Goal: Participate in treatment (ie., meds, therapy) throughout shift  Outcome: Progressing  Goal: No symptoms of aspiration throughout shift  Outcome: Progressing  Goal: No symptoms of hemorrhage throughout shift  Outcome: Progressing  Goal: Tolerate enteral feeding throughout shift  Outcome: Progressing  Goal: Decreased nausea/vomiting throughout shift  Outcome: Progressing  Goal: Controlled blood glucose throughout shift  Outcome: Progressing  Goal: Out of bed three times today  Outcome: Progressing     Problem: ICU Stroke  Goal: Maintain ICP within ordered limits throughout shift  Outcome: Progressing  Goal: Tolerate EVD clamping trial throughout shift  Outcome: Progressing  Goal: Tolerate ventilator weaning trial during shift  Outcome: Progressing  Goal: Maintain patent airway throughout shift  Outcome: Progressing  Goal: Achieve/maintain targeted sodium level throughout shift  Outcome: Progressing     Problem: Pain - Adult  Goal: Verbalizes/displays adequate comfort level or baseline comfort level  Outcome: Progressing     Problem: Safety - Adult  Goal: Free from fall injury  Outcome: Progressing     Problem: Discharge Planning  Goal: Discharge to home or other facility with appropriate resources  Outcome: Progressing     Problem: Chronic Conditions and Co-morbidities  Goal: Patient's chronic conditions and co-morbidity symptoms are monitored and maintained or improved  Outcome: Progressing     Problem: Nutrition  Goal: Nutrient intake appropriate for maintaining nutritional needs  Outcome:  Progressing

## 2025-08-01 ENCOUNTER — PATIENT OUTREACH (OUTPATIENT)
Dept: PRIMARY CARE | Facility: CLINIC | Age: 80
End: 2025-08-01
Payer: MEDICARE

## 2025-08-01 LAB — HOLD SPECIMEN: NORMAL

## 2025-08-01 ASSESSMENT — VISUAL ACUITY: OU: 1

## 2025-08-01 NOTE — PROGRESS NOTES
"Discharge Facility:  [facility]   Discharge Diagnosis: slurred speech  Admission Date: 7/30/25  Discharge Date:  7/31/25    PCP Appointment Date: 8/8/25  Specialist Appointment Date:   Hospital Encounter and Summary Linked: Yes  ED to Hosp-Admission (Discharged) with Fawad Lyons MD; Oh Sandoval MD (07/30/2025)   See discharge assessment below for further details     Wrap Up  Wrap Up Additional Comments: Successful transitions of care outreach to patient.  Patient reports she is doing okay since discharge. States she has felt a little overwhelmed with new diagnosis of having had a stroke as she is \"usually very healthy\".  Support provided.  Reviewed her new medications and verified she has all of them.  Educated on signs and symptoms of a stroke to watch for and importance of going to ER if she experiences these symptoms.  She reports her speech seems to be improving.  Emphasized importance of following up with her PCP post-discharge. Patient given my contact information and is aware of my availability for any non-emergent questions or concerns. (8/1/2025  4:34 PM)    Engagement  Call Start Time: 1629 (8/1/2025  4:34 PM)    Medications  Medications reviewed with patient/caregiver?: Yes (8/1/2025  4:34 PM)  Is the patient having any side effects they believe may be caused by any medication additions or changes?: No (8/1/2025  4:34 PM)  Does the patient have all medications ordered at discharge?: Yes (8/1/2025  4:34 PM)  Care Management Interventions: Provided patient education (8/1/2025  4:34 PM)  Prescription Comments: Start: aspirin 81mg daily   Atorvastatin 40mg daily at bedtime  Cephalexin 500mg BID x 3 days  Clopidogrel 75mg daily  Change: atenolol-chlorhtalidone 50-25mg 1 tablet once daily. (8/1/2025  4:34 PM)  Is the patient taking all medications as directed (includes completed medication regime)?: Yes (8/1/2025  4:34 PM)  Care Management Interventions: Provided patient education (8/1/2025  4:34 " "PM)  Medication Comments: Reviewed new and changed medications with patient. The medication listed under \"changed\" pt reports is the same as she has been taking. Patient at first stated she only had the atorvastatin.  Called pharmacy to check status of other Rxs and learned patient had picked up all Rxs.  Called patient back and she did locate other Rxs. Confirmed she had all new Rxs and reviewed the instructions with her.  She verbalized understanding. (8/1/2025  4:34 PM)    Appointments  Does the patient have a primary care provider?: Yes (8/1/2025  4:34 PM)  Care Management Interventions: Verified appointment date/time/provider (8/1/2025  4:34 PM)  Has the patient kept scheduled appointments due by today?: Not applicable (8/1/2025  4:34 PM)    Patient Teaching  Does the patient have access to their discharge instructions?: Yes (8/1/2025  4:34 PM)  Care Management Interventions: Reviewed instructions with patient (8/1/2025  4:34 PM)  What is the patient's perception of their health status since discharge?: Improving (8/1/2025  4:34 PM)  Is the patient/caregiver able to teach back the hierarchy of who to call/visit for symptoms/problems? PCP, Specialist, Home Health nurse, Urgent Care, ED, 911: Yes (8/1/2025  4:34 PM)        "

## 2025-08-02 LAB — BACTERIA UR CULT: ABNORMAL

## 2025-08-05 LAB
ATRIAL RATE: 52 BPM
P AXIS: 64 DEGREES
P OFFSET: 190 MS
P ONSET: 134 MS
PR INTERVAL: 180 MS
Q ONSET: 224 MS
QRS COUNT: 8 BEATS
QRS DURATION: 90 MS
QT INTERVAL: 464 MS
QTC CALCULATION(BAZETT): 431 MS
QTC FREDERICIA: 442 MS
R AXIS: -34 DEGREES
T AXIS: 58 DEGREES
T OFFSET: 456 MS
VENTRICULAR RATE: 52 BPM

## 2025-08-06 ENCOUNTER — HOSPITAL ENCOUNTER (OUTPATIENT)
Dept: RADIOLOGY | Facility: CLINIC | Age: 80
Discharge: HOME | End: 2025-08-06
Payer: MEDICARE

## 2025-08-06 DIAGNOSIS — Z12.31 ENCOUNTER FOR SCREENING MAMMOGRAM FOR BREAST CANCER: ICD-10-CM

## 2025-08-08 ENCOUNTER — APPOINTMENT (OUTPATIENT)
Facility: CLINIC | Age: 80
End: 2025-08-08
Payer: MEDICARE

## 2025-08-08 VITALS
SYSTOLIC BLOOD PRESSURE: 130 MMHG | RESPIRATION RATE: 18 BRPM | HEART RATE: 55 BPM | HEIGHT: 72 IN | TEMPERATURE: 97.5 F | OXYGEN SATURATION: 98 % | BODY MASS INDEX: 28.85 KG/M2 | DIASTOLIC BLOOD PRESSURE: 72 MMHG | WEIGHT: 213 LBS

## 2025-08-08 DIAGNOSIS — Z09 HOSPITAL DISCHARGE FOLLOW-UP: Primary | ICD-10-CM

## 2025-08-08 DIAGNOSIS — E87.6 HYPOKALEMIA: Primary | ICD-10-CM

## 2025-08-08 DIAGNOSIS — R47.81 SLURRED SPEECH: ICD-10-CM

## 2025-08-08 DIAGNOSIS — I63.9 ISCHEMIC CEREBROVASCULAR ACCIDENT (CVA) (MULTI): ICD-10-CM

## 2025-08-08 DIAGNOSIS — R06.83 SNORING: ICD-10-CM

## 2025-08-08 PROBLEM — C55 MALIGNANT NEOPLASM OF UTERUS, UNSPECIFIED SITE (MULTI): Status: RESOLVED | Noted: 2023-09-13 | Resolved: 2025-08-08

## 2025-08-08 PROCEDURE — 1159F MED LIST DOCD IN RCRD: CPT | Performed by: FAMILY MEDICINE

## 2025-08-08 PROCEDURE — 99495 TRANSJ CARE MGMT MOD F2F 14D: CPT | Performed by: FAMILY MEDICINE

## 2025-08-08 PROCEDURE — 1036F TOBACCO NON-USER: CPT | Performed by: FAMILY MEDICINE

## 2025-08-08 PROCEDURE — 3078F DIAST BP <80 MM HG: CPT | Performed by: FAMILY MEDICINE

## 2025-08-08 PROCEDURE — 3075F SYST BP GE 130 - 139MM HG: CPT | Performed by: FAMILY MEDICINE

## 2025-08-08 RX ORDER — POTASSIUM CHLORIDE 20 MEQ/1
20 TABLET, EXTENDED RELEASE ORAL DAILY
Qty: 30 TABLET | Refills: 0 | Status: SHIPPED | OUTPATIENT
Start: 2025-08-08

## 2025-08-08 ASSESSMENT — ENCOUNTER SYMPTOMS
DEPRESSION: 0
CONSTITUTIONAL NEGATIVE: 1
LOSS OF SENSATION IN FEET: 0
ENDOCRINE NEGATIVE: 1
CARDIOVASCULAR NEGATIVE: 1
RESPIRATORY NEGATIVE: 1
EYES NEGATIVE: 1
SPEECH DIFFICULTY: 1
PSYCHIATRIC NEGATIVE: 1
GASTROINTESTINAL NEGATIVE: 1
OCCASIONAL FEELINGS OF UNSTEADINESS: 0

## 2025-08-08 NOTE — PROGRESS NOTES
"Subjective     Patient ID: Fadia Akbar is a 80 y.o. female who presents for Hospital Follow-up (Discharge Facility: [facility] /Discharge Diagnosis: slurred speech/Admission Date: 25/Discharge Date:  25///).  HPI  Patient: Fadia Akbar  : 1945  PCP: Temitope Sue DO  MRN: [id number]     Fadia Akbar is a 80 y.o. female presenting today for follow-up after being discharged from the hospital 9 days ago. The main problem requiring admission was Slurred Speech. The discharge summary and/or Transitional Care Management documentation was reviewed. Medication reconciliation was performed as indicated via the \"Rodney as Reviewed\" timestamp.     Fadia Akbar was contacted by Transitional Care Management services two days after her discharge. This encounter and supporting documentation was reviewed.  80 YOF presenting to [facility] ED with dysarthria x 3 days.  Began suddenly, and slowly worsened per patient. Family convinced her to seek medical attention. On exam NIH1 for mild-mod dysarthria  No VF deficits, symmetric smile, no aphasia, intact cerebellar movements, no pronator drift, normal & equal strength of BUE & BLE, follows all commands. CTH negative. Awaiting MRI/MRA. ASA loaded, started on high intensity statin. Neurology consulted.    Discharge medications include: asa, plavix, atorvastatin and three days of abx for UTI.     Review of Systems   Constitutional: Negative.    HENT: Negative.     Eyes: Negative.    Respiratory: Negative.     Cardiovascular: Negative.    Gastrointestinal: Negative.    Endocrine: Negative.    Genitourinary: Negative.    Skin: Negative.    Neurological:  Positive for speech difficulty.   Psychiatric/Behavioral: Negative.         Objective     Vitals:    25 0808   BP: 130/72   Pulse: 55   Resp: 18   Temp: 36.4 °C (97.5 °F)   SpO2: 98%   Weight: 96.6 kg (213 lb)   Height: 1.829 m (6')        Current Outpatient Medications   Medication Instructions    " aspirin 81 mg, oral, Daily    atenoloL-chlorthalidone (Tenoretic) 50-25 mg tablet 1 tablet, oral, Daily    atorvastatin (LIPITOR) 40 mg, oral, Nightly    clopidogrel (PLAVIX) 75 mg, oral, Daily    FLUoxetine (PROZAC) 20 mg, oral, Daily    levothyroxine (SYNTHROID, LEVOXYL) 75 mcg, oral, Daily    potassium chloride CR 20 mEq ER tablet 20 mEq, oral, Daily, do not crush chew or split    traZODone (DESYREL) 50 mg, oral, Nightly        Physical Exam  Constitutional:       Appearance: Normal appearance.     Cardiovascular:      Rate and Rhythm: Normal rate and regular rhythm.   Pulmonary:      Effort: Pulmonary effort is normal.      Breath sounds: Normal breath sounds.     Skin:     General: Skin is warm and dry.     Neurological:      Mental Status: She is alert.      Cranial Nerves: No cranial nerve deficit.      Sensory: No sensory deficit.      Comments: Slightly slurred speech       The complexity of medical decision making for this patient's transitional care is moderate.  Assessment/Plan   Assessment & Plan  Hospital discharge follow-up    Orders:    Referral to Neurology; Future    Ischemic cerebrovascular accident (CVA) (Multi)    Orders:    Referral to Neurology; Future    Slurred speech         Snoring  Referral placed for a Sleep Study

## 2025-08-08 NOTE — PROGRESS NOTES
Review of Systems    Family History[1]    No data recorded           [1] No family history on file.

## 2025-08-13 ENCOUNTER — PATIENT OUTREACH (OUTPATIENT)
Dept: PRIMARY CARE | Facility: CLINIC | Age: 80
End: 2025-08-13
Payer: MEDICARE

## 2025-09-04 ENCOUNTER — APPOINTMENT (OUTPATIENT)
Dept: NEUROLOGY | Facility: CLINIC | Age: 80
End: 2025-09-04
Payer: MEDICARE

## 2025-09-04 ASSESSMENT — LIFESTYLE VARIABLES
HOW OFTEN DO YOU HAVE SIX OR MORE DRINKS ON ONE OCCASION: NEVER
HOW MANY STANDARD DRINKS CONTAINING ALCOHOL DO YOU HAVE ON A TYPICAL DAY: PATIENT DOES NOT DRINK
SKIP TO QUESTIONS 9-10: 1
HOW OFTEN DO YOU HAVE A DRINK CONTAINING ALCOHOL: NEVER
AUDIT-C TOTAL SCORE: 0

## 2025-11-12 ENCOUNTER — APPOINTMENT (OUTPATIENT)
Facility: CLINIC | Age: 80
End: 2025-11-12
Payer: MEDICARE

## 2026-09-03 ENCOUNTER — APPOINTMENT (OUTPATIENT)
Dept: NEUROLOGY | Facility: CLINIC | Age: 81
End: 2026-09-03
Payer: MEDICARE